# Patient Record
Sex: FEMALE | Race: WHITE | NOT HISPANIC OR LATINO | URBAN - METROPOLITAN AREA
[De-identification: names, ages, dates, MRNs, and addresses within clinical notes are randomized per-mention and may not be internally consistent; named-entity substitution may affect disease eponyms.]

---

## 2022-07-13 ENCOUNTER — OUTPATIENT (OUTPATIENT)
Dept: OUTPATIENT SERVICES | Facility: HOSPITAL | Age: 72
LOS: 1 days | End: 2022-07-13
Payer: MEDICARE

## 2022-07-13 VITALS
OXYGEN SATURATION: 99 % | HEIGHT: 64 IN | WEIGHT: 143.96 LBS | HEART RATE: 100 BPM | SYSTOLIC BLOOD PRESSURE: 157 MMHG | DIASTOLIC BLOOD PRESSURE: 74 MMHG | RESPIRATION RATE: 17 BRPM | TEMPERATURE: 97 F

## 2022-07-13 DIAGNOSIS — S62.001A UNSPECIFIED FRACTURE OF NAVICULAR [SCAPHOID] BONE OF RIGHT WRIST, INITIAL ENCOUNTER FOR CLOSED FRACTURE: ICD-10-CM

## 2022-07-13 DIAGNOSIS — Z98.890 OTHER SPECIFIED POSTPROCEDURAL STATES: Chronic | ICD-10-CM

## 2022-07-13 DIAGNOSIS — Z90.49 ACQUIRED ABSENCE OF OTHER SPECIFIED PARTS OF DIGESTIVE TRACT: Chronic | ICD-10-CM

## 2022-07-13 DIAGNOSIS — Z01.818 ENCOUNTER FOR OTHER PREPROCEDURAL EXAMINATION: ICD-10-CM
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
## 2022-07-13 LAB
ALBUMIN SERPL ELPH-MCNC: 4.3 G/DL — SIGNIFICANT CHANGE UP (ref 3.3–5)
ALP SERPL-CCNC: 100 U/L — SIGNIFICANT CHANGE UP (ref 40–120)
ALT FLD-CCNC: 22 U/L — SIGNIFICANT CHANGE UP (ref 12–78)
ANION GAP SERPL CALC-SCNC: 6 MMOL/L — SIGNIFICANT CHANGE UP (ref 5–17)
AST SERPL-CCNC: 18 U/L — SIGNIFICANT CHANGE UP (ref 15–37)
BILIRUB SERPL-MCNC: 0.5 MG/DL — SIGNIFICANT CHANGE UP (ref 0.2–1.2)
BUN SERPL-MCNC: 14 MG/DL — SIGNIFICANT CHANGE UP (ref 7–23)
CALCIUM SERPL-MCNC: 10.1 MG/DL — SIGNIFICANT CHANGE UP (ref 8.5–10.1)
CHLORIDE SERPL-SCNC: 106 MMOL/L — SIGNIFICANT CHANGE UP (ref 96–108)
CO2 SERPL-SCNC: 29 MMOL/L — SIGNIFICANT CHANGE UP (ref 22–31)
CREAT SERPL-MCNC: 0.67 MG/DL — SIGNIFICANT CHANGE UP (ref 0.5–1.3)
EGFR: 93 ML/MIN/1.73M2 — SIGNIFICANT CHANGE UP
GLUCOSE SERPL-MCNC: 98 MG/DL — SIGNIFICANT CHANGE UP (ref 70–99)
HCT VFR BLD CALC: 40.6 % — SIGNIFICANT CHANGE UP (ref 34.5–45)
HGB BLD-MCNC: 13.8 G/DL — SIGNIFICANT CHANGE UP (ref 11.5–15.5)
MCHC RBC-ENTMCNC: 33.3 PG — SIGNIFICANT CHANGE UP (ref 27–34)
MCHC RBC-ENTMCNC: 34 GM/DL — SIGNIFICANT CHANGE UP (ref 32–36)
MCV RBC AUTO: 98.1 FL — SIGNIFICANT CHANGE UP (ref 80–100)
NRBC # BLD: 0 /100 WBCS — SIGNIFICANT CHANGE UP (ref 0–0)
PLATELET # BLD AUTO: 460 K/UL — HIGH (ref 150–400)
POTASSIUM SERPL-MCNC: 3.5 MMOL/L — SIGNIFICANT CHANGE UP (ref 3.5–5.3)
POTASSIUM SERPL-SCNC: 3.5 MMOL/L — SIGNIFICANT CHANGE UP (ref 3.5–5.3)
PROT SERPL-MCNC: 7.6 G/DL — SIGNIFICANT CHANGE UP (ref 6–8.3)
RBC # BLD: 4.14 M/UL — SIGNIFICANT CHANGE UP (ref 3.8–5.2)
RBC # FLD: 14.5 % — SIGNIFICANT CHANGE UP (ref 10.3–14.5)
SODIUM SERPL-SCNC: 141 MMOL/L — SIGNIFICANT CHANGE UP (ref 135–145)
WBC # BLD: 10.42 K/UL — SIGNIFICANT CHANGE UP (ref 3.8–10.5)
WBC # FLD AUTO: 10.42 K/UL — SIGNIFICANT CHANGE UP (ref 3.8–10.5)

## 2022-07-13 PROCEDURE — G0463: CPT

## 2022-07-13 PROCEDURE — 93005 ELECTROCARDIOGRAM TRACING: CPT

## 2022-07-13 PROCEDURE — 93010 ELECTROCARDIOGRAM REPORT: CPT

## 2022-07-13 PROCEDURE — 80053 COMPREHEN METABOLIC PANEL: CPT

## 2022-07-13 PROCEDURE — 85027 COMPLETE CBC AUTOMATED: CPT

## 2022-07-13 PROCEDURE — 36415 COLL VENOUS BLD VENIPUNCTURE: CPT

## 2022-07-13 NOTE — H&P PST ADULT - PROBLEM SELECTOR PLAN 1
PST: CBC, CMP,EKG  Medical evaluation with Dr. Sprague  All preoperative instructions including CHD cleanse reviewed with patient who verbalized understanding.   Avoid all NSAID/ASA containing products as well as all herbal/vitamin supplements. Tylenol only for pain/headache.   covids   Fully vaccinated; Denies recent travel, recent illness and sick contact with COVID in the last 3 weeks

## 2022-07-13 NOTE — H&P PST ADULT - HISTORY OF PRESENT ILLNESS
This 70 yo f with a pMHX of HTN, HLD, Asthma  presents to PST for a scheduled  ORIF right scaphoid fracture with possible bone grafting using mini fluoroscopy  with Dr Muir on 7/21/22  . Pt is electing to have this procedure.

## 2022-07-13 NOTE — H&P PST ADULT - NSANTHOSAYNRD_GEN_A_CORE
No. ARIANNA screening performed.  STOP BANG Legend: 0-2 = LOW Risk; 3-4 = INTERMEDIATE Risk; 5-8 = HIGH Risk

## 2022-07-13 NOTE — H&P PST ADULT - FALL HARM RISK - UNIVERSAL INTERVENTIONS
Bed in lowest position, wheels locked, appropriate side rails in place/Call bell, personal items and telephone in reach/Instruct patient to call for assistance before getting out of bed or chair/Non-slip footwear when patient is out of bed/[place] to call system/Physically safe environment - no spills, clutter or unnecessary equipment/Purposeful Proactive Rounding/Room/bathroom lighting operational, light cord in reach

## 2022-07-13 NOTE — H&P PST ADULT - ASSESSMENT
This 70 yo f with a pMHX of HTN, HLD, Asthma  presents to PST for a scheduled  ORIF right scaphoid fracture with possible bone grafting using mini fluoroscopy  with Dr Muir on 7/21/22  .

## 2022-07-13 NOTE — H&P PST ADULT - RESPIRATORY
normal/clear to auscultation bilaterally/no wheezes/no rales/no rhonchi/no dull ness or hyperresonance to percussion

## 2022-07-20 ENCOUNTER — TRANSCRIPTION ENCOUNTER (OUTPATIENT)
Age: 72
End: 2022-07-20

## 2022-07-21 ENCOUNTER — TRANSCRIPTION ENCOUNTER (OUTPATIENT)
Age: 72
End: 2022-07-21

## 2022-07-21 ENCOUNTER — OUTPATIENT (OUTPATIENT)
Dept: OUTPATIENT SERVICES | Facility: HOSPITAL | Age: 72
LOS: 1 days | End: 2022-07-21
Payer: MEDICARE

## 2022-07-21 VITALS
WEIGHT: 139.99 LBS | RESPIRATION RATE: 15 BRPM | DIASTOLIC BLOOD PRESSURE: 76 MMHG | SYSTOLIC BLOOD PRESSURE: 136 MMHG | HEIGHT: 64 IN | OXYGEN SATURATION: 97 % | HEART RATE: 83 BPM | TEMPERATURE: 98 F

## 2022-07-21 VITALS
DIASTOLIC BLOOD PRESSURE: 67 MMHG | RESPIRATION RATE: 13 BRPM | SYSTOLIC BLOOD PRESSURE: 119 MMHG | HEART RATE: 85 BPM | OXYGEN SATURATION: 96 % | TEMPERATURE: 98 F

## 2022-07-21 DIAGNOSIS — Z98.890 OTHER SPECIFIED POSTPROCEDURAL STATES: Chronic | ICD-10-CM

## 2022-07-21 DIAGNOSIS — S62.001A UNSPECIFIED FRACTURE OF NAVICULAR [SCAPHOID] BONE OF RIGHT WRIST, INITIAL ENCOUNTER FOR CLOSED FRACTURE: ICD-10-CM

## 2022-07-21 DIAGNOSIS — Z90.49 ACQUIRED ABSENCE OF OTHER SPECIFIED PARTS OF DIGESTIVE TRACT: Chronic | ICD-10-CM

## 2022-07-21 PROCEDURE — 76000 FLUOROSCOPY <1 HR PHYS/QHP: CPT

## 2022-07-21 PROCEDURE — 25628 OPTX CARPL SCPHD FX INT FIXJ: CPT | Mod: RT

## 2022-07-21 PROCEDURE — C1713: CPT

## 2022-07-21 PROCEDURE — C1769: CPT

## 2022-07-21 DEVICE — GWIRE ST .045X6IN: Type: IMPLANTABLE DEVICE | Status: FUNCTIONAL

## 2022-07-21 DEVICE — IMPLANTABLE DEVICE: Type: IMPLANTABLE DEVICE | Status: FUNCTIONAL

## 2022-07-21 RX ORDER — SODIUM CHLORIDE 9 MG/ML
1000 INJECTION, SOLUTION INTRAVENOUS
Refills: 0 | Status: DISCONTINUED | OUTPATIENT
Start: 2022-07-21 | End: 2022-07-21

## 2022-07-21 RX ORDER — CEFAZOLIN SODIUM 1 G
2000 VIAL (EA) INJECTION ONCE
Refills: 0 | Status: COMPLETED | OUTPATIENT
Start: 2022-07-21 | End: 2022-07-21

## 2022-07-21 RX ORDER — HYDROMORPHONE HYDROCHLORIDE 2 MG/ML
0.5 INJECTION INTRAMUSCULAR; INTRAVENOUS; SUBCUTANEOUS
Refills: 0 | Status: DISCONTINUED | OUTPATIENT
Start: 2022-07-21 | End: 2022-07-21

## 2022-07-21 RX ORDER — OXYCODONE HYDROCHLORIDE 5 MG/1
5 TABLET ORAL ONCE
Refills: 0 | Status: DISCONTINUED | OUTPATIENT
Start: 2022-07-21 | End: 2022-07-21

## 2022-07-21 RX ORDER — ONDANSETRON 8 MG/1
4 TABLET, FILM COATED ORAL ONCE
Refills: 0 | Status: DISCONTINUED | OUTPATIENT
Start: 2022-07-21 | End: 2022-07-21

## 2022-07-21 RX ADMIN — HYDROMORPHONE HYDROCHLORIDE 0.5 MILLIGRAM(S): 2 INJECTION INTRAMUSCULAR; INTRAVENOUS; SUBCUTANEOUS at 13:31

## 2022-07-21 RX ADMIN — SODIUM CHLORIDE 60 MILLILITER(S): 9 INJECTION, SOLUTION INTRAVENOUS at 08:33

## 2022-07-21 RX ADMIN — SODIUM CHLORIDE 75 MILLILITER(S): 9 INJECTION, SOLUTION INTRAVENOUS at 13:17

## 2022-07-21 RX ADMIN — HYDROMORPHONE HYDROCHLORIDE 0.5 MILLIGRAM(S): 2 INJECTION INTRAMUSCULAR; INTRAVENOUS; SUBCUTANEOUS at 13:18

## 2022-07-21 RX ADMIN — OXYCODONE HYDROCHLORIDE 5 MILLIGRAM(S): 5 TABLET ORAL at 14:32

## 2022-07-21 RX ADMIN — OXYCODONE HYDROCHLORIDE 5 MILLIGRAM(S): 5 TABLET ORAL at 13:31

## 2022-07-21 NOTE — ASU DISCHARGE PLAN (ADULT/PEDIATRIC) - NS MD DC FALL RISK RISK
For information on Fall & Injury Prevention, visit: [email or web address] OR  [email or web address] OR  https://www.cdc.gov/steadi/patient.html

## 2022-07-21 NOTE — ASU DISCHARGE PLAN (ADULT/PEDIATRIC) - CARE PROVIDER_API CALL
George Bledsoe (MD)  Orthopaedic Surgery  [address] [place] [address]  Phone: [phone]  Fax: [phone]  Established Patient  Follow Up Time: 2 weeks

## 2022-07-21 NOTE — ASU DISCHARGE PLAN (ADULT/PEDIATRIC) - ASU DC SPECIAL INSTRUCTIONSFT
1.  Keep dressing/splint clean and dry; do NOT remove.    2.  Elevate your hand above chest level as much as possible throughout the day, to help reduce swelling.    3.  Call Dr. Bledsoe's office as soon as possible, to schedule a follow-up appointment to be seen in 13 days (on Wednesday 8/3).    4.  Smoking cessation is highly recommended to promote adequate fracture healing.

## 2022-07-21 NOTE — ASU DISCHARGE PLAN (ADULT/PEDIATRIC) - MEDICATION INSTRUCTIONS
The following prescriptions have been electronically sent to your pharmacy:  1) Percocet 5/325 mg (take 1-to-2 tablets by mouth, every 4-to-6 hours as needed for as needed for pain; 2) Keflex 500 mg (take 1 tablet by mouth, 4 times a day x 5 days)

## 2022-08-17 ENCOUNTER — NON-APPOINTMENT (OUTPATIENT)
Age: 72
End: 2022-08-17

## 2023-01-02 ENCOUNTER — NON-APPOINTMENT (OUTPATIENT)
Age: 73
End: 2023-01-02

## 2023-02-08 NOTE — ASU PATIENT PROFILE, ADULT - ARRIVAL TIME
Labs - cbc, cmp, microalbumin    Attempted to call [facility], no answer.   Left msg relaying result note.  Advised to call back if any q's or concerns.   08:15

## 2023-07-24 PROBLEM — E78.5 HYPERLIPIDEMIA, UNSPECIFIED: Chronic | Status: ACTIVE | Noted: 2022-07-13

## 2023-07-24 PROBLEM — I10 ESSENTIAL (PRIMARY) HYPERTENSION: Chronic | Status: ACTIVE | Noted: 2022-07-13

## 2023-07-27 ENCOUNTER — NON-APPOINTMENT (OUTPATIENT)
Age: 73
End: 2023-07-27

## 2023-08-11 ENCOUNTER — APPOINTMENT (OUTPATIENT)
Dept: ORTHOPEDIC SURGERY | Facility: CLINIC | Age: 73
End: 2023-08-11
Payer: MEDICARE

## 2023-08-11 VITALS
HEIGHT: 65 IN | BODY MASS INDEX: 23.32 KG/M2 | WEIGHT: 140 LBS | DIASTOLIC BLOOD PRESSURE: 70 MMHG | SYSTOLIC BLOOD PRESSURE: 106 MMHG

## 2023-08-11 DIAGNOSIS — M25.562 PAIN IN LEFT KNEE: ICD-10-CM

## 2023-08-11 DIAGNOSIS — J45.20 MILD INTERMITTENT ASTHMA, UNCOMPLICATED: ICD-10-CM

## 2023-08-11 DIAGNOSIS — M17.12 UNILATERAL PRIMARY OSTEOARTHRITIS, LEFT KNEE: ICD-10-CM

## 2023-08-11 DIAGNOSIS — Z80.0 FAMILY HISTORY OF MALIGNANT NEOPLASM OF DIGESTIVE ORGANS: ICD-10-CM

## 2023-08-11 DIAGNOSIS — F17.200 NICOTINE DEPENDENCE, UNSPECIFIED, UNCOMPLICATED: ICD-10-CM

## 2023-08-11 DIAGNOSIS — Z86.39 PERSONAL HISTORY OF OTHER ENDOCRINE, NUTRITIONAL AND METABOLIC DISEASE: ICD-10-CM

## 2023-08-11 PROBLEM — Z00.00 ENCOUNTER FOR PREVENTIVE HEALTH EXAMINATION: Status: ACTIVE | Noted: 2023-08-11

## 2023-08-11 PROCEDURE — 73564 X-RAY EXAM KNEE 4 OR MORE: CPT | Mod: LT

## 2023-08-11 PROCEDURE — 99205 OFFICE O/P NEW HI 60 MIN: CPT

## 2023-08-11 RX ORDER — ATORVASTATIN CALCIUM 20 MG/1
20 TABLET, FILM COATED ORAL
Refills: 0 | Status: ACTIVE | COMMUNITY

## 2023-08-11 RX ORDER — BENAZEPRIL HYDROCHLORIDE AND HYDROCHLOROTHIAZIDE 20; 25 MG/1; MG/1
TABLET, FILM COATED ORAL
Refills: 0 | Status: ACTIVE | COMMUNITY

## 2023-08-11 RX ORDER — ASPIRIN 81 MG
81 TABLET,CHEWABLE ORAL
Refills: 0 | Status: ACTIVE | COMMUNITY

## 2023-08-11 RX ORDER — FLUTICASONE PROPION/SALMETEROL 500-50 MCG
BLISTER, WITH INHALATION DEVICE INHALATION
Refills: 0 | Status: ACTIVE | COMMUNITY

## 2023-08-11 RX ORDER — UBIDECARENONE 200 MG
CAPSULE ORAL
Refills: 0 | Status: ACTIVE | COMMUNITY

## 2023-08-11 NOTE — HISTORY OF PRESENT ILLNESS
[de-identified] : Ms. RAKESH BERNARD is a 72 year old female evaluation of a few months worth of left knee pain.  Her left knee pain is localized medially and posteriorly.  She also has significant swelling in his knee.  Patient denies any falls or trauma.  She states that over the past few months she had multiple rounds of gel and cortisone injections.  Her most recent injection was 5 weeks ago which was steroid.  Prior to this she had gel she reports little to no relief with either.  During 2 of these injections the knee was aspirated yellow fluid came right back.  She is tried therapy and anti-inflammatories without relief She reports smoking 1 pack of cigarettes a day and has a history of COPD.

## 2023-08-11 NOTE — PHYSICAL EXAM
[de-identified] : The patient appears well nourished and in no apparent distress.  The patient is alert and oriented to person, place, and time.   Affect and mood appear normal. The head is normocephalic and atraumatic.  The eyes reveal normal sclera and extra ocular muscles are intact. The tongue is midline with no apparent lesions.  Skin shows normal turgor with no evidence of eczema or psoriasis.  No respiratory distress noted.  Sensation grossly intact.		  [de-identified] : Exam of the left knee shows a varus alignment which is correctable, -5 to 112 degrees of flexion measured with a goniometer.  5/5 motor strength bilaterally distally. Sensation intact distally.		  [de-identified] : X-ray: 4 views of the left knee demonstrate bone on bone varus arthritis.

## 2023-08-11 NOTE — ADDENDUM
[FreeTextEntry1] : This note was authored by Robe eDnny working as a medical scribe for Dr. Jovan Rosas. The note was reviewed, edited, and revised by Dr. Jovan Rosas whom is in agreement with the exam findings, imaging findings, and treatment plan. 08/11/2023

## 2023-08-11 NOTE — DISCUSSION/SUMMARY
[de-identified] : RAKESH BERNARD is a 72 year old female who presents with left knee bone on bone varus arthritis. Based upon the patient's continued symptoms and failure to respond to conservative treatment, I have recommended a left total knee replacement for this patient. A long discussion took place with the patient describing what a total joint replacement is and what the procedure would entail. A knee model, similar to the implants that will be used during the operation, was utilized to demonstrate the implants. Choices of implant manufactures were discussed and reviewed. The ability to secure the implant utilizing cement or cementless (press fit) fixation was discussed. The patient agrees with the plan of care as well as the use of implants.  The hospitalization and post-operative care and rehabilitation were also discussed. The use of perioperative antibiotics and DVT prophylaxis were discussed. The risk, benefits and alternatives to a surgical intervention were discussed at length with the patient. The patient was also advised of risks related to the medical comorbidities and elevated body mass index (BMI). A lengthy discussion took place to review the most common complications including but not limited to: deep vein thrombosis, pulmonary embolus, heart attack, stroke, infection, wound breakdown, numbness, damage to nerves, tendon, muscles, arteries or other blood vessels, death and other possible complications from anesthesia. The patient was told that we will take steps to minimize these risks by using sterile technique, antibiotics and DVT prophylaxis when appropriate and follow the patient postoperatively in the office setting to monitor progress. The possibility of recurrent pain, no improvement in pain and actual worsening of pain were also discussed with the patient.  The discharge plan of care focused on the patient going home following surgery. The patient was encouraged to make the necessary arrangements to have someone stay with them when they are discharged home. Following discharge, a home care nurse will visit the patient. The home care nurse will open your home care case and request home physical therapy services. Home physical therapy will commence following discharge provided it is appropriate and covered by the health insurance benefit plan.  The benefits of surgery were discussed with the patient including the potential for improving the patient's current clinical condition through operative intervention. Alternatives to surgical intervention including continued conservative management were also discussed in detail. All questions were answered to the satisfaction of the patient. The treatment plan of care, as well as a model of a total knee equivalent to the one that will be used for their total joint replacement, was shared with the patient. The patient participated and agreed to the plan of care as well as the use of the recommended implants for their total joint replacement surgery.   We discussed that the knee replacement will be done with robotic assistance to enhance accuracy and dynamic joint balancing.		   The patient was counseled on smoking cessation prior to surgery. We discussed that her risk of perioperative complication is drastically higher if she is smoking and that we require smoking cessation prior to elective knee replacement surgery. The patient was referred to a smoking cessation program.

## 2023-08-11 NOTE — CONSULT LETTER
[Dear  ___] : Dear  [unfilled], [Consult Letter:] : I had the pleasure of evaluating your patient, [unfilled]. [Please see my note below.] : Please see my note below. [Consult Closing:] : Thank you very much for allowing me to participate in the care of this patient.  If you have any questions, please do not hesitate to contact me. [Sincerely,] : Sincerely, [FreeTextEntry2] : Aquilino Guerra MD [FreeTextEntry3] : Jovan Rosas MD Chief of Joint Replacement Primary & Revision Hip and Knee Replacement  [facility]

## 2023-08-19 ENCOUNTER — NON-APPOINTMENT (OUTPATIENT)
Age: 73
End: 2023-08-19

## 2023-09-05 ENCOUNTER — APPOINTMENT (OUTPATIENT)
Dept: CT IMAGING | Facility: CLINIC | Age: 73
End: 2023-09-05
Payer: MEDICARE

## 2023-09-05 PROCEDURE — 73700 CT LOWER EXTREMITY W/O DYE: CPT | Mod: LT

## 2023-09-12 ENCOUNTER — OUTPATIENT (OUTPATIENT)
Dept: OUTPATIENT SERVICES | Facility: HOSPITAL | Age: 73
LOS: 1 days | End: 2023-09-12
Payer: MEDICARE

## 2023-09-12 VITALS
HEIGHT: 64 IN | OXYGEN SATURATION: 98 % | TEMPERATURE: 97 F | HEART RATE: 98 BPM | DIASTOLIC BLOOD PRESSURE: 84 MMHG | WEIGHT: 137.13 LBS | SYSTOLIC BLOOD PRESSURE: 132 MMHG | RESPIRATION RATE: 18 BRPM

## 2023-09-12 DIAGNOSIS — I10 ESSENTIAL (PRIMARY) HYPERTENSION: ICD-10-CM

## 2023-09-12 DIAGNOSIS — Z13.89 ENCOUNTER FOR SCREENING FOR OTHER DISORDER: ICD-10-CM

## 2023-09-12 DIAGNOSIS — Z98.890 OTHER SPECIFIED POSTPROCEDURAL STATES: Chronic | ICD-10-CM

## 2023-09-12 DIAGNOSIS — M17.12 UNILATERAL PRIMARY OSTEOARTHRITIS, LEFT KNEE: ICD-10-CM

## 2023-09-12 DIAGNOSIS — Z29.9 ENCOUNTER FOR PROPHYLACTIC MEASURES, UNSPECIFIED: ICD-10-CM

## 2023-09-12 DIAGNOSIS — Z01.818 ENCOUNTER FOR OTHER PREPROCEDURAL EXAMINATION: ICD-10-CM

## 2023-09-12 DIAGNOSIS — Z90.49 ACQUIRED ABSENCE OF OTHER SPECIFIED PARTS OF DIGESTIVE TRACT: Chronic | ICD-10-CM

## 2023-09-12 LAB
A1C WITH ESTIMATED AVERAGE GLUCOSE RESULT: 5.6 % — SIGNIFICANT CHANGE UP (ref 4–5.6)
ANION GAP SERPL CALC-SCNC: 14 MMOL/L — SIGNIFICANT CHANGE UP (ref 5–17)
APTT BLD: 29.6 SEC — SIGNIFICANT CHANGE UP (ref 24.5–35.6)
BASOPHILS # BLD AUTO: 0.09 K/UL — SIGNIFICANT CHANGE UP (ref 0–0.2)
BASOPHILS NFR BLD AUTO: 0.9 % — SIGNIFICANT CHANGE UP (ref 0–2)
BLD GP AB SCN SERPL QL: SIGNIFICANT CHANGE UP
BUN SERPL-MCNC: 13.2 MG/DL — SIGNIFICANT CHANGE UP (ref 8–20)
CALCIUM SERPL-MCNC: 9.9 MG/DL — SIGNIFICANT CHANGE UP (ref 8.4–10.5)
CHLORIDE SERPL-SCNC: 99 MMOL/L — SIGNIFICANT CHANGE UP (ref 96–108)
CO2 SERPL-SCNC: 27 MMOL/L — SIGNIFICANT CHANGE UP (ref 22–29)
CREAT SERPL-MCNC: 0.55 MG/DL — SIGNIFICANT CHANGE UP (ref 0.5–1.3)
EGFR: 97 ML/MIN/1.73M2 — SIGNIFICANT CHANGE UP
EOSINOPHIL # BLD AUTO: 0.11 K/UL — SIGNIFICANT CHANGE UP (ref 0–0.5)
EOSINOPHIL NFR BLD AUTO: 1.1 % — SIGNIFICANT CHANGE UP (ref 0–6)
ESTIMATED AVERAGE GLUCOSE: 114 MG/DL — SIGNIFICANT CHANGE UP (ref 68–114)
GLUCOSE SERPL-MCNC: 98 MG/DL — SIGNIFICANT CHANGE UP (ref 70–99)
HCT VFR BLD CALC: 39.6 % — SIGNIFICANT CHANGE UP (ref 34.5–45)
HGB BLD-MCNC: 13.5 G/DL — SIGNIFICANT CHANGE UP (ref 11.5–15.5)
IMM GRANULOCYTES NFR BLD AUTO: 0.3 % — SIGNIFICANT CHANGE UP (ref 0–0.9)
INR BLD: 0.86 RATIO — SIGNIFICANT CHANGE UP (ref 0.85–1.18)
LYMPHOCYTES # BLD AUTO: 3.15 K/UL — SIGNIFICANT CHANGE UP (ref 1–3.3)
LYMPHOCYTES # BLD AUTO: 30.1 % — SIGNIFICANT CHANGE UP (ref 13–44)
MCHC RBC-ENTMCNC: 33 PG — SIGNIFICANT CHANGE UP (ref 27–34)
MCHC RBC-ENTMCNC: 34.1 GM/DL — SIGNIFICANT CHANGE UP (ref 32–36)
MCV RBC AUTO: 96.8 FL — SIGNIFICANT CHANGE UP (ref 80–100)
MONOCYTES # BLD AUTO: 1.17 K/UL — HIGH (ref 0–0.9)
MONOCYTES NFR BLD AUTO: 11.2 % — SIGNIFICANT CHANGE UP (ref 2–14)
MRSA PCR RESULT.: SIGNIFICANT CHANGE UP
NEUTROPHILS # BLD AUTO: 5.91 K/UL — SIGNIFICANT CHANGE UP (ref 1.8–7.4)
NEUTROPHILS NFR BLD AUTO: 56.4 % — SIGNIFICANT CHANGE UP (ref 43–77)
PLATELET # BLD AUTO: 492 K/UL — HIGH (ref 150–400)
POTASSIUM SERPL-MCNC: 3.7 MMOL/L — SIGNIFICANT CHANGE UP (ref 3.5–5.3)
POTASSIUM SERPL-SCNC: 3.7 MMOL/L — SIGNIFICANT CHANGE UP (ref 3.5–5.3)
PROTHROM AB SERPL-ACNC: 9.6 SEC — SIGNIFICANT CHANGE UP (ref 9.5–13)
RBC # BLD: 4.09 M/UL — SIGNIFICANT CHANGE UP (ref 3.8–5.2)
RBC # FLD: 13.1 % — SIGNIFICANT CHANGE UP (ref 10.3–14.5)
S AUREUS DNA NOSE QL NAA+PROBE: SIGNIFICANT CHANGE UP
SODIUM SERPL-SCNC: 140 MMOL/L — SIGNIFICANT CHANGE UP (ref 135–145)
WBC # BLD: 10.46 K/UL — SIGNIFICANT CHANGE UP (ref 3.8–10.5)
WBC # FLD AUTO: 10.46 K/UL — SIGNIFICANT CHANGE UP (ref 3.8–10.5)

## 2023-09-12 PROCEDURE — 71046 X-RAY EXAM CHEST 2 VIEWS: CPT

## 2023-09-12 PROCEDURE — 71046 X-RAY EXAM CHEST 2 VIEWS: CPT | Mod: 26

## 2023-09-12 PROCEDURE — 93010 ELECTROCARDIOGRAM REPORT: CPT

## 2023-09-12 PROCEDURE — G0463: CPT

## 2023-09-12 PROCEDURE — 93005 ELECTROCARDIOGRAM TRACING: CPT

## 2023-09-12 RX ORDER — SODIUM CHLORIDE 9 MG/ML
3 INJECTION INTRAMUSCULAR; INTRAVENOUS; SUBCUTANEOUS EVERY 8 HOURS
Refills: 0 | Status: DISCONTINUED | OUTPATIENT
Start: 2023-10-02 | End: 2023-10-02

## 2023-09-12 NOTE — H&P PST ADULT - PROBLEM SELECTOR PLAN 1
71 yo with PMH HTN, asthma, HLD now with left knee osteoarthritis scheduled for left total knee replacement on 10/2/2023.     -Medical evaluation pending  - Patient educated on ERP protocol (written/verbal)- verbalized understanding  -Educated on NSAIDS, multivitamins and herbals that increase the risk of bleeding and need to be stopped 5 days before procedure  -Educated on infection prevention  -Tylenol can be taken 5 days before surgery if needed for pain  -Stop benazapril 24 hours before surgery  -ASA per PCP  -Will continue all other medications as prescribed  -Verbalized understanding of all instructions. 73 yo with PMH HTN, asthma, HLD now with left knee osteoarthritis scheduled for left total knee replacement on 10/2/2023.     -Medical evaluation pending  -Cardiac evaluation pending  - Patient educated on ERP protocol (written/verbal)- verbalized understanding  -Educated on NSAIDS, multivitamins and herbals that increase the risk of bleeding and need to be stopped 5 days before procedure  -Educated on infection prevention  -Tylenol can be taken 5 days before surgery if needed for pain  -Stop benazapril 24 hours before surgery  -ASA per PCP  -Will continue all other medications as prescribed  -Verbalized understanding of all instructions.

## 2023-09-12 NOTE — H&P PST ADULT - MUSCULOSKELETAL
details… no joint swelling/no joint erythema/no joint warmth/strength 5/5 bilateral upper extremities/strength 5/5 bilateral lower extremities

## 2023-09-12 NOTE — H&P PST ADULT - NSICDXFAMILYHX_GEN_ALL_CORE_FT
FAMILY HISTORY:  Father  Still living? Unknown  FH: liver cancer, Age at diagnosis: Age Unknown    Sibling  Still living? Unknown  FH: liver cancer, Age at diagnosis: Age Unknown

## 2023-09-12 NOTE — H&P PST ADULT - ADMIT DATE
Call 911 for stroke/Need for follow up after discharge/Prescribed medications/Risk factors for stroke/Stroke education booklet/Stroke support groups for patients, families, and friends/Stroke warning signs and symptoms/Signs and symptoms of stroke
12-Sep-2023

## 2023-09-12 NOTE — H&P PST ADULT - HISTORY OF PRESENT ILLNESS
71 yo with PMH HTN,HLD presents to PST today. Pt. reports pain to the left knee for about 6 months. Described pain as ache with no radiation to LLE. Received many gel injections in the past with no relief. Reports completed cortisone injections previously with the last one with no relief. Pt. reports pain is intermittent. Walking upstairs have been exacerbating pain. Lying down alleviate pain. Pain levels include a current pain level of 7/10, a minimum pain level of 5/10 and a maximum pain level of 10/10. Pt. Self ambulates.  73 yo with PMH HTN, HLD, asthma presents to PST today. Pt. reports pain to the left knee for about 6 months. Described pain as ache with no radiation to LLE. Received many gel injections in the past with no relief. Reports completed cortisone injections previously with the last one with no relief. Pt. reports pain is intermittent. Walking upstairs have been exacerbating pain. Lying down alleviate pain. Pain levels include a current pain level of 7/10, a minimum pain level of 5/10 and a maximum pain level of 10/10. Pt. Self ambulates. Scheduled for left total knee replacement on 10/2/2023.  73 yo with PMH HTN, HLD, abnormal EKG, asthma presents to PST today. Pt. reports pain to the left knee for about 6 months. Described pain as ache with no radiation to LLE. Received many gel injections in the past with no relief. Reports completed cortisone injections previously with the last one with no relief. Pt. reports pain is intermittent. Walking upstairs have been exacerbating pain. Lying down alleviate pain. Pain levels include a current pain level of 7/10, a minimum pain level of 5/10 and a maximum pain level of 10/10. Pt. Self ambulates. Scheduled for left total knee replacement on 10/2/2023.

## 2023-09-12 NOTE — H&P PST ADULT - ASSESSMENT
73 yo with PMH HTN, asthma, HLD now with left knee osteoarthritis scheduled for left total knee replacement on 10/2/2023.     -Medical evaluation pending  - Patient educated on ERP protocol (written/verbal)- verbalized understanding  -Educated on NSAIDS, multivitamins and herbals that increase the risk of bleeding and need to be stopped 5 days before procedure  -Educated on infection prevention  -Tylenol can be taken 5 days before surgery if needed for pain  -Stop benazapril 24 hours before surgery  -ASA per PCP  -Will continue all other medications as prescribed  -Verbalized understanding of all instructions.      OPIOID RISK TOOL    TOSHIA EACH BOX THAT APPLIES AND ADD TOTALS AT THE END    FAMILY HISTORY OF SUBSTANCE ABUSE                 FEMALE         MALE                                                Alcohol                             [  ]1 pt          [  ]3pts                                               Illegal Durgs                     [  ]2 pts        [  ]3pts                                               Rx Drugs                           [  ]4 pts        [  ]4 pts    PERSONAL HISTORY OF SUBSTANCE ABUSE                                                                                          Alcohol                             [  ]3 pts       [  ]3 pts                                               Illegal Drugs                     [  ]4 pts        [  ]4 pts                                               Rx Drugs                           [  ]5 pts        [  ]5 pts    AGE BETWEEN 16-45 YEARS                                      [  ]1 pt         [  ]1 pt    HISTORY OF PREADOLESCENT   SEXUAL ABUSE                                                             [  ]3 pts        [  ]0pts    PSYCHOLOGICAL DISEASE                     ADD, OCD, Bipolar, Schizophrenia        [  ]2 pts         [  ]2 pts                      Depression                                               [  ]1 pt           [  ]1 pt           SCORING TOTAL   (add numbers and type here)              (0)                                     A score of 3 or lower indicated LOW risk for future opioid abuse  A score of 4 to 7 indicated moderate risk for future opioid abuse  A score of 8 or higher indicates a high risk for opioid abuse      CAPRINI SCORE [CLOT]    AGE RELATED RISK FACTORS                                                       MOBILITY RELATED FACTORS  [ ] Age 41-60 years                                            (1 Point)                  [ ] Bed rest                                                        (1 Point)  [x ] Age: 61-74 years                                           (2 Points)                 [ ] Plaster cast                                                   (2 Points)  [ ] Age= 75 years                                              (3 Points)                 [ ] Bed bound for more than 72 hours                 (2 Points)    DISEASE RELATED RISK FACTORS                                               GENDER SPECIFIC FACTORS  [ ] Edema in the lower extremities                       (1 Point)                  [ ] Pregnancy                                                     (1 Point)  [ ] Varicose veins                                               (1 Point)                  [ ] Post-partum < 6 weeks                                   (1 Point)             [ ] BMI > 25 Kg/m2                                            (1 Point)                  [ ] Hormonal therapy  or oral contraception          (1 Point)                 [ ] Sepsis (in the previous month)                        (1 Point)                  [ ] History of pregnancy complications                 (1 point)  [ ] Pneumonia or serious lung disease                                               [ ] Unexplained or recurrent                     (1 Point)           (in the previous month)                               (1 Point)  [ ] Abnormal pulmonary function test                     (1 Point)                 SURGERY RELATED RISK FACTORS  [ ] Acute myocardial infarction                              (1 Point)                 [ ]  Section                                             (1 Point)  [ ] Congestive heart failure (in the previous month)  (1 Point)               [ ] Minor surgery                                                  (1 Point)   [ ] Inflammatory bowel disease                             (1 Point)                 [ ] Arthroscopic surgery                                        (2 Points)  [ ] Central venous access                                      (2 Points)                [x ] General surgery lasting more than 45 minutes   (2 Points)       [ ] Stroke (in the previous month)                          (5 Points)               [ ] Elective arthroplasty                                         (5 Points)                                                                                                                                               HEMATOLOGY RELATED FACTORS                                                 TRAUMA RELATED RISK FACTORS  [ ] Prior episodes of VTE                                     (3 Points)                [ ] Fracture of the hip, pelvis, or leg                       (5 Points)  [ ] Positive family history for VTE                         (3 Points)                 [ ] Acute spinal cord injury (in the previous month)  (5 Points)  [ ] Prothrombin 23912 A                                     (3 Points)                 [ ] Paralysis  (less than 1 month)                             (5 Points)  [ ] Factor V Leiden                                             (3 Points)                  [ ] Multiple Trauma within 1 month                        (5 Points)  [ ] Lupus anticoagulants                                     (3 Points)                                                           [ ] Anticardiolipin antibodies                               (3 Points)                                                       [ ] High homocysteine in the blood                      (3 Points)                                             [ ] Other congenital or acquired thrombophilia      (3 Points)                                                [ ] Heparin induced thrombocytopenia                  (3 Points)                                          Total Score [        4  ]    Caprini Score 0 - 2:  Low Risk, No VTE Prophylaxis required for most patients, encourage ambulation  Caprini Score 3 - 6:  At Risk, pharmacologic VTE prophylaxis is indicated for most patients (in the absence of a contraindication)  Caprini Score Greater than or = 7:  High Risk, pharmacologic VTE prophylaxis is indicated for most patients (in the absence of a contraindication)   73 yo with PMH HTN, asthma, abnormal EKG, HLD now with left knee osteoarthritis scheduled for left total knee replacement on 10/2/2023.     -Medical evaluation pending  -Cardiac evaluation pending  - Patient educated on ERP protocol (written/verbal)- verbalized understanding  -Educated on NSAIDS, multivitamins and herbals that increase the risk of bleeding and need to be stopped 5 days before procedure  -Educated on infection prevention  -Tylenol can be taken 5 days before surgery if needed for pain  -Stop benazapril 24 hours before surgery  -ASA per PCP  -Will continue all other medications as prescribed  -Verbalized understanding of all instructions.      OPIOID RISK TOOL    TOSHIA EACH BOX THAT APPLIES AND ADD TOTALS AT THE END    FAMILY HISTORY OF SUBSTANCE ABUSE                 FEMALE         MALE                                                Alcohol                             [  ]1 pt          [  ]3pts                                               Illegal Durgs                     [  ]2 pts        [  ]3pts                                               Rx Drugs                           [  ]4 pts        [  ]4 pts    PERSONAL HISTORY OF SUBSTANCE ABUSE                                                                                          Alcohol                             [  ]3 pts       [  ]3 pts                                               Illegal Drugs                     [  ]4 pts        [  ]4 pts                                               Rx Drugs                           [  ]5 pts        [  ]5 pts    AGE BETWEEN 16-45 YEARS                                      [  ]1 pt         [  ]1 pt    HISTORY OF PREADOLESCENT   SEXUAL ABUSE                                                             [  ]3 pts        [  ]0pts    PSYCHOLOGICAL DISEASE                     ADD, OCD, Bipolar, Schizophrenia        [  ]2 pts         [  ]2 pts                      Depression                                               [  ]1 pt           [  ]1 pt           SCORING TOTAL   (add numbers and type here)              (0)                                     A score of 3 or lower indicated LOW risk for future opioid abuse  A score of 4 to 7 indicated moderate risk for future opioid abuse  A score of 8 or higher indicates a high risk for opioid abuse      CAPRINI SCORE [CLOT]    AGE RELATED RISK FACTORS                                                       MOBILITY RELATED FACTORS  [ ] Age 41-60 years                                            (1 Point)                  [ ] Bed rest                                                        (1 Point)  [x ] Age: 61-74 years                                           (2 Points)                 [ ] Plaster cast                                                   (2 Points)  [ ] Age= 75 years                                              (3 Points)                 [ ] Bed bound for more than 72 hours                 (2 Points)    DISEASE RELATED RISK FACTORS                                               GENDER SPECIFIC FACTORS  [ ] Edema in the lower extremities                       (1 Point)                  [ ] Pregnancy                                                     (1 Point)  [ ] Varicose veins                                               (1 Point)                  [ ] Post-partum < 6 weeks                                   (1 Point)             [ ] BMI > 25 Kg/m2                                            (1 Point)                  [ ] Hormonal therapy  or oral contraception          (1 Point)                 [ ] Sepsis (in the previous month)                        (1 Point)                  [ ] History of pregnancy complications                 (1 point)  [ ] Pneumonia or serious lung disease                                               [ ] Unexplained or recurrent                     (1 Point)           (in the previous month)                               (1 Point)  [ ] Abnormal pulmonary function test                     (1 Point)                 SURGERY RELATED RISK FACTORS  [ ] Acute myocardial infarction                              (1 Point)                 [ ]  Section                                             (1 Point)  [ ] Congestive heart failure (in the previous month)  (1 Point)               [ ] Minor surgery                                                  (1 Point)   [ ] Inflammatory bowel disease                             (1 Point)                 [ ] Arthroscopic surgery                                        (2 Points)  [ ] Central venous access                                      (2 Points)                [x ] General surgery lasting more than 45 minutes   (2 Points)       [ ] Stroke (in the previous month)                          (5 Points)               [ ] Elective arthroplasty                                         (5 Points)                                                                                                                                               HEMATOLOGY RELATED FACTORS                                                 TRAUMA RELATED RISK FACTORS  [ ] Prior episodes of VTE                                     (3 Points)                [ ] Fracture of the hip, pelvis, or leg                       (5 Points)  [ ] Positive family history for VTE                         (3 Points)                 [ ] Acute spinal cord injury (in the previous month)  (5 Points)  [ ] Prothrombin 20996 A                                     (3 Points)                 [ ] Paralysis  (less than 1 month)                             (5 Points)  [ ] Factor V Leiden                                             (3 Points)                  [ ] Multiple Trauma within 1 month                        (5 Points)  [ ] Lupus anticoagulants                                     (3 Points)                                                           [ ] Anticardiolipin antibodies                               (3 Points)                                                       [ ] High homocysteine in the blood                      (3 Points)                                             [ ] Other congenital or acquired thrombophilia      (3 Points)                                                [ ] Heparin induced thrombocytopenia                  (3 Points)                                          Total Score [        4  ]    Caprini Score 0 - 2:  Low Risk, No VTE Prophylaxis required for most patients, encourage ambulation  Caprini Score 3 - 6:  At Risk, pharmacologic VTE prophylaxis is indicated for most patients (in the absence of a contraindication)  Caprini Score Greater than or = 7:  High Risk, pharmacologic VTE prophylaxis is indicated for most patients (in the absence of a contraindication)

## 2023-09-17 ENCOUNTER — NON-APPOINTMENT (OUTPATIENT)
Age: 73
End: 2023-09-17

## 2023-09-27 RX ORDER — TRANEXAMIC ACID 100 MG/ML
1000 INJECTION, SOLUTION INTRAVENOUS ONCE
Refills: 0 | Status: DISCONTINUED | OUTPATIENT
Start: 2023-10-02 | End: 2023-10-02

## 2023-10-01 ENCOUNTER — TRANSCRIPTION ENCOUNTER (OUTPATIENT)
Age: 73
End: 2023-10-01

## 2023-10-02 ENCOUNTER — APPOINTMENT (OUTPATIENT)
Dept: ORTHOPEDIC SURGERY | Facility: HOSPITAL | Age: 73
End: 2023-10-02

## 2023-10-02 ENCOUNTER — TRANSCRIPTION ENCOUNTER (OUTPATIENT)
Age: 73
End: 2023-10-02

## 2023-10-02 ENCOUNTER — INPATIENT (INPATIENT)
Facility: HOSPITAL | Age: 73
LOS: 0 days | Discharge: HOME HEALTH | DRG: 470 | End: 2023-10-03
Attending: ORTHOPAEDIC SURGERY | Admitting: ORTHOPAEDIC SURGERY
Payer: COMMERCIAL

## 2023-10-02 VITALS
OXYGEN SATURATION: 98 % | RESPIRATION RATE: 18 BRPM | SYSTOLIC BLOOD PRESSURE: 156 MMHG | WEIGHT: 136.03 LBS | TEMPERATURE: 98 F | DIASTOLIC BLOOD PRESSURE: 74 MMHG | HEIGHT: 64 IN | HEART RATE: 81 BPM

## 2023-10-02 DIAGNOSIS — M17.12 UNILATERAL PRIMARY OSTEOARTHRITIS, LEFT KNEE: ICD-10-CM

## 2023-10-02 DIAGNOSIS — Z90.49 ACQUIRED ABSENCE OF OTHER SPECIFIED PARTS OF DIGESTIVE TRACT: Chronic | ICD-10-CM

## 2023-10-02 DIAGNOSIS — Z98.890 OTHER SPECIFIED POSTPROCEDURAL STATES: Chronic | ICD-10-CM

## 2023-10-02 LAB — BLD GP AB SCN SERPL QL: SIGNIFICANT CHANGE UP

## 2023-10-02 PROCEDURE — 27447 TOTAL KNEE ARTHROPLASTY: CPT | Mod: LT

## 2023-10-02 PROCEDURE — 0055T BONE SRGRY CMPTR CT/MRI IMAG: CPT | Mod: LT

## 2023-10-02 PROCEDURE — 73560 X-RAY EXAM OF KNEE 1 OR 2: CPT | Mod: 26,LT

## 2023-10-02 PROCEDURE — 27447 TOTAL KNEE ARTHROPLASTY: CPT | Mod: AS,LT

## 2023-10-02 DEVICE — BASEPLATE TIB UNIV TRIATHLON SZ 2: Type: IMPLANTABLE DEVICE | Site: LEFT | Status: FUNCTIONAL

## 2023-10-02 DEVICE — IMP PATELLA SYMMETRIC X3 29X8MM: Type: IMPLANTABLE DEVICE | Site: LEFT | Status: FUNCTIONAL

## 2023-10-02 DEVICE — MAKO BONE PIN 4MM X 140MM: Type: IMPLANTABLE DEVICE | Site: LEFT | Status: FUNCTIONAL

## 2023-10-02 DEVICE — COMP FEM TRIATHLON CR SZ 3 LT: Type: IMPLANTABLE DEVICE | Site: LEFT | Status: FUNCTIONAL

## 2023-10-02 DEVICE — ZIMMER FEMALE HEX SCREW MAGNETIC 2.5MM X 25MM: Type: IMPLANTABLE DEVICE | Site: LEFT | Status: FUNCTIONAL

## 2023-10-02 DEVICE — CEMENT PALACOS R: Type: IMPLANTABLE DEVICE | Site: LEFT | Status: FUNCTIONAL

## 2023-10-02 DEVICE — MAKO BONE PIN 4MM X 80MM: Type: IMPLANTABLE DEVICE | Site: LEFT | Status: FUNCTIONAL

## 2023-10-02 DEVICE — INSERT TIB BEARING CS X3 SZ 2 9MM: Type: IMPLANTABLE DEVICE | Site: LEFT | Status: FUNCTIONAL

## 2023-10-02 RX ORDER — CEFAZOLIN SODIUM 1 G
2000 VIAL (EA) INJECTION ONCE
Refills: 0 | Status: DISCONTINUED | OUTPATIENT
Start: 2023-10-02 | End: 2023-10-02

## 2023-10-02 RX ORDER — HYDROMORPHONE HYDROCHLORIDE 2 MG/ML
4 INJECTION INTRAMUSCULAR; INTRAVENOUS; SUBCUTANEOUS
Refills: 0 | Status: DISCONTINUED | OUTPATIENT
Start: 2023-10-02 | End: 2023-10-03

## 2023-10-02 RX ORDER — ACETAMINOPHEN 500 MG
975 TABLET ORAL ONCE
Refills: 0 | Status: COMPLETED | OUTPATIENT
Start: 2023-10-02 | End: 2023-10-02

## 2023-10-02 RX ORDER — CELECOXIB 200 MG/1
400 CAPSULE ORAL ONCE
Refills: 0 | Status: COMPLETED | OUTPATIENT
Start: 2023-10-02 | End: 2023-10-02

## 2023-10-02 RX ORDER — SODIUM CHLORIDE 9 MG/ML
1000 INJECTION, SOLUTION INTRAVENOUS
Refills: 0 | Status: DISCONTINUED | OUTPATIENT
Start: 2023-10-02 | End: 2023-10-02

## 2023-10-02 RX ORDER — OXYCODONE HYDROCHLORIDE 5 MG/1
5 TABLET ORAL
Refills: 0 | Status: DISCONTINUED | OUTPATIENT
Start: 2023-10-02 | End: 2023-10-03

## 2023-10-02 RX ORDER — ALBUTEROL 90 UG/1
2 AEROSOL, METERED ORAL
Refills: 0 | DISCHARGE

## 2023-10-02 RX ORDER — ONDANSETRON 8 MG/1
4 TABLET, FILM COATED ORAL ONCE
Refills: 0 | Status: DISCONTINUED | OUTPATIENT
Start: 2023-10-02 | End: 2023-10-02

## 2023-10-02 RX ORDER — APREPITANT 80 MG/1
40 CAPSULE ORAL ONCE
Refills: 0 | Status: COMPLETED | OUTPATIENT
Start: 2023-10-02 | End: 2023-10-02

## 2023-10-02 RX ORDER — INFLUENZA VIRUS VACCINE 15; 15; 15; 15 UG/.5ML; UG/.5ML; UG/.5ML; UG/.5ML
0.7 SUSPENSION INTRAMUSCULAR ONCE
Refills: 0 | Status: DISCONTINUED | OUTPATIENT
Start: 2023-10-02 | End: 2023-10-03

## 2023-10-02 RX ORDER — ALBUTEROL 90 UG/1
2 AEROSOL, METERED ORAL EVERY 6 HOURS
Refills: 0 | Status: DISCONTINUED | OUTPATIENT
Start: 2023-10-02 | End: 2023-10-03

## 2023-10-02 RX ORDER — BENZOCAINE AND MENTHOL 5; 1 G/100ML; G/100ML
1 LIQUID ORAL EVERY 24 HOURS
Refills: 0 | Status: DISCONTINUED | OUTPATIENT
Start: 2023-10-02 | End: 2023-10-03

## 2023-10-02 RX ORDER — POLYETHYLENE GLYCOL 3350 17 G/17G
17 POWDER, FOR SOLUTION ORAL AT BEDTIME
Refills: 0 | Status: DISCONTINUED | OUTPATIENT
Start: 2023-10-02 | End: 2023-10-03

## 2023-10-02 RX ORDER — KETOROLAC TROMETHAMINE 30 MG/ML
15 SYRINGE (ML) INJECTION EVERY 6 HOURS
Refills: 0 | Status: COMPLETED | OUTPATIENT
Start: 2023-10-02 | End: 2023-10-03

## 2023-10-02 RX ORDER — FENTANYL CITRATE 50 UG/ML
25 INJECTION INTRAVENOUS
Refills: 0 | Status: DISCONTINUED | OUTPATIENT
Start: 2023-10-02 | End: 2023-10-02

## 2023-10-02 RX ORDER — ATORVASTATIN CALCIUM 80 MG/1
0 TABLET, FILM COATED ORAL
Qty: 0 | Refills: 0 | DISCHARGE

## 2023-10-02 RX ORDER — LISINOPRIL 2.5 MG/1
20 TABLET ORAL DAILY
Refills: 0 | Status: DISCONTINUED | OUTPATIENT
Start: 2023-10-03 | End: 2023-10-03

## 2023-10-02 RX ORDER — BENAZEPRIL HYDROCHLORIDE AND HYDROCHLOROTHIAZIDE 10; 12.5 MG/1; MG/1
0 TABLET, FILM COATED ORAL
Qty: 0 | Refills: 0 | DISCHARGE

## 2023-10-02 RX ORDER — ACETAMINOPHEN 500 MG
975 TABLET ORAL EVERY 8 HOURS
Refills: 0 | Status: DISCONTINUED | OUTPATIENT
Start: 2023-10-02 | End: 2023-10-03

## 2023-10-02 RX ORDER — MAGNESIUM HYDROXIDE 400 MG/1
30 TABLET, CHEWABLE ORAL DAILY
Refills: 0 | Status: DISCONTINUED | OUTPATIENT
Start: 2023-10-02 | End: 2023-10-03

## 2023-10-02 RX ORDER — CELECOXIB 200 MG/1
200 CAPSULE ORAL EVERY 12 HOURS
Refills: 0 | Status: CANCELLED | OUTPATIENT
Start: 2023-10-04 | End: 2023-10-03

## 2023-10-02 RX ORDER — FLUTICASONE PROPIONATE AND SALMETEROL 50; 250 UG/1; UG/1
1 POWDER ORAL; RESPIRATORY (INHALATION)
Refills: 0 | DISCHARGE

## 2023-10-02 RX ORDER — ONDANSETRON 8 MG/1
4 TABLET, FILM COATED ORAL EVERY 6 HOURS
Refills: 0 | Status: DISCONTINUED | OUTPATIENT
Start: 2023-10-02 | End: 2023-10-03

## 2023-10-02 RX ORDER — SENNA PLUS 8.6 MG/1
2 TABLET ORAL AT BEDTIME
Refills: 0 | Status: DISCONTINUED | OUTPATIENT
Start: 2023-10-02 | End: 2023-10-03

## 2023-10-02 RX ORDER — ASPIRIN/CALCIUM CARB/MAGNESIUM 324 MG
1 TABLET ORAL
Refills: 0 | DISCHARGE

## 2023-10-02 RX ORDER — OXYCODONE HYDROCHLORIDE 5 MG/1
10 TABLET ORAL
Refills: 0 | Status: DISCONTINUED | OUTPATIENT
Start: 2023-10-02 | End: 2023-10-03

## 2023-10-02 RX ORDER — CEFAZOLIN SODIUM 1 G
2000 VIAL (EA) INJECTION
Refills: 0 | Status: COMPLETED | OUTPATIENT
Start: 2023-10-02 | End: 2023-10-03

## 2023-10-02 RX ORDER — ATORVASTATIN CALCIUM 80 MG/1
20 TABLET, FILM COATED ORAL AT BEDTIME
Refills: 0 | Status: DISCONTINUED | OUTPATIENT
Start: 2023-10-03 | End: 2023-10-03

## 2023-10-02 RX ORDER — ACETAMINOPHEN 500 MG
1000 TABLET ORAL ONCE
Refills: 0 | Status: COMPLETED | OUTPATIENT
Start: 2023-10-02 | End: 2023-10-03

## 2023-10-02 RX ORDER — BUDESONIDE AND FORMOTEROL FUMARATE DIHYDRATE 160; 4.5 UG/1; UG/1
2 AEROSOL RESPIRATORY (INHALATION)
Refills: 0 | Status: DISCONTINUED | OUTPATIENT
Start: 2023-10-02 | End: 2023-10-03

## 2023-10-02 RX ORDER — APIXABAN 2.5 MG/1
2.5 TABLET, FILM COATED ORAL
Refills: 0 | Status: DISCONTINUED | OUTPATIENT
Start: 2023-10-03 | End: 2023-10-03

## 2023-10-02 RX ORDER — SODIUM CHLORIDE 9 MG/ML
1000 INJECTION INTRAMUSCULAR; INTRAVENOUS; SUBCUTANEOUS
Refills: 0 | Status: DISCONTINUED | OUTPATIENT
Start: 2023-10-02 | End: 2023-10-03

## 2023-10-02 RX ORDER — PANTOPRAZOLE SODIUM 20 MG/1
40 TABLET, DELAYED RELEASE ORAL
Refills: 0 | Status: DISCONTINUED | OUTPATIENT
Start: 2023-10-02 | End: 2023-10-03

## 2023-10-02 RX ADMIN — BENZOCAINE AND MENTHOL 1 LOZENGE: 5; 1 LIQUID ORAL at 22:08

## 2023-10-02 RX ADMIN — Medication 975 MILLIGRAM(S): at 23:24

## 2023-10-02 RX ADMIN — CELECOXIB 400 MILLIGRAM(S): 200 CAPSULE ORAL at 12:42

## 2023-10-02 RX ADMIN — APREPITANT 40 MILLIGRAM(S): 80 CAPSULE ORAL at 12:42

## 2023-10-02 RX ADMIN — Medication 2000 MILLIGRAM(S): at 22:07

## 2023-10-02 RX ADMIN — Medication 975 MILLIGRAM(S): at 12:42

## 2023-10-02 RX ADMIN — Medication 975 MILLIGRAM(S): at 22:08

## 2023-10-02 RX ADMIN — SODIUM CHLORIDE 75 MILLILITER(S): 9 INJECTION INTRAMUSCULAR; INTRAVENOUS; SUBCUTANEOUS at 22:10

## 2023-10-02 NOTE — DISCHARGE NOTE PROVIDER - CARE PROVIDER_API CALL
Jovan Rosas.  Orthopaedic Surgery  [address] [facility] [address]  [place] [address]  Phone: [phone]  Fax: [phone]  Follow Up Time:

## 2023-10-02 NOTE — DISCHARGE NOTE PROVIDER - HOSPITAL COURSE
The patient underwent a LEFT TOTAL KNEE REPLACEMENT on 10/2/2023. The patient received antibiotics consistent with SCIP guidelines. The patient underwent the procedure and had no intra-operative complications. Post-operatively, the patient was seen by medicine and PT. The patient received ELIQUIS for DVTP. The patient received pain medications per orthopedic pain management pathway and the pain was appropriately controlled. The patient did not have any post-operative medical complications. The patient was discharged in stable condition.

## 2023-10-02 NOTE — PHYSICAL THERAPY INITIAL EVALUATION ADULT - ADDITIONAL COMMENTS
Pt reports living with spouse in a condo with 3 RUI c rail, and bedroom on the 2nd level with 12 steps c rail. Pt amb without device and is independent with functional mobility, ADLs, and IADLs. Pt drives and is retired. Pt has support of family. Pt owns RW.

## 2023-10-02 NOTE — DISCHARGE NOTE PROVIDER - NSDCMRMEDTOKEN_GEN_ALL_CORE_FT
Advair Diskus 100 mcg-50 mcg inhalation powder: 1 inhaled 2 times a day  Albuterol (Eqv-ProAir HFA) 90 mcg/inh inhalation aerosol: 2 inhaled as needed for  shortness of breath and/or wheezing  aspirin 81 mg oral capsule: 1 orally once a day  ATORVASTATIN CALCIUM  20 MG TABS:   BENAZEPRIL HYDROCHLORIDE/HYDROCHLOR OTHIAZIDE 20-12.5 MG TABS:   coQ10 daily:   multivitamin daily:    acetaminophen 325 mg oral tablet: 3 tab(s) orally every 8 hours  Advair Diskus 100 mcg-50 mcg inhalation powder: 1 inhaled 2 times a day  Albuterol (Eqv-ProAir HFA) 90 mcg/inh inhalation aerosol: 2 inhaled as needed for  shortness of breath and/or wheezing  aspirin 81 mg oral capsule: 1 orally once a day  aspirin 81 mg oral delayed release tablet: 1 tab(s) orally 2 times a day to begin after the completion of eliquis  ATORVASTATIN CALCIUM  20 MG TABS:   BENAZEPRIL HYDROCHLORIDE/HYDROCHLOR OTHIAZIDE 20-12.5 MG TABS:   CeleBREX 200 mg oral capsule: 1 cap(s) orally 2 times a day  coQ10 daily:   Eliquis 2.5 mg oral tablet: 1 tab(s) orally 2 times a day  multivitamin daily:   oxyCODONE 5 mg oral tablet: 1 tab(s) orally every 6 hours as needed for  moderate pain MDD: 4  Protonix 40 mg oral delayed release tablet: 1 tab(s) orally once a day  Senna S 50 mg-8.6 mg oral tablet: 2 tab(s) orally once a day (at bedtime)

## 2023-10-02 NOTE — DISCHARGE NOTE PROVIDER - NSDCFUADDINST_GEN_ALL_CORE_FT
The patient will be seen in the office between 2-3 weeks for wound check.   **Your first post-operative visit has been scheduled prior to your admission. PLEASE CONTACT OFFICE TO CONFIRM THE APPOINTMENT DATE.   **  The silver based dressing is to be removed 7 days from the date of surgery.   ** CONTACT THE OFFICE IF THE FOLLOWING DEVELOP:  - the dressing becomes soiled or saturated  - you develop a fever greater that 101F  - the wound becomes red or you develop blistering around the wound  * Patient may shower after post-op day #3.   * The patient will continue home PT consistent with  total knee replacement protocol. Transition to outpatient PT will occur at the time of the first office visit.   * The patient will practice knee extension exercises regularly to minimize hamstring contraction.   * The patient is FULL weight bearing.  *** The patient will continue ELIQUIS 2.5mg twice a day for 2 weeks and then begin ASPIRIN 81mg twice a day for blood clot prevention. *** While on aspirin, the patient will take daily omeprazole or other similar medication to protect the stomach from irritation.   * The patient will take OXYCODONE AND TYLENOL for pain control and adjust according to prescription and patient needs. Contact the office if pain increases while taking prescribed pain medications or related concerns develop.  * Celebrex 200mg will be taken twice daily starting 10/4/2023 for 3 weeks for pain control and prevention of excessive bone growth. Additional prescription may be requested at your office follow-up visit.   * The patient will take Senna S while taking oxycodone to prevent narcotic associated constipation.  Additionally, increase water intake (drink at least 8 glasses of water daily) and try adding fiber to the diet by eating fruits, vegetables and foods that are rich in grains. If constipation is experienced, contact the medical/primary care provider to discuss further treatment options.  * To avoid injury at home:  - continue use of rolling walker until cleared by physical therapist  - have family or friend remove all throw rug or objects in hallways that may present a trip hazard.  - if you experience any dizziness or medical concerns, call your medical doctor or  911.  * The implant may activate metal detection devices.

## 2023-10-02 NOTE — PHYSICAL THERAPY INITIAL EVALUATION ADULT - RANGE OF MOTION EXAMINATION, REHAB EVAL
except left knee 0 to 90 deg/bilateral upper extremity ROM was WFL (within functional limits)/bilateral lower extremity ROM was WFL (within functional limits)

## 2023-10-02 NOTE — DISCHARGE NOTE PROVIDER - NSDCFUSCHEDAPPT_GEN_ALL_CORE_FT
Jovan Rosas  [facility]  ORTHOSURG [address]  Scheduled Appointment: 10/26/2023    Jovan Rosas  [facility]  ORTHORKURT [address]  Scheduled Appointment: 11/17/2023

## 2023-10-02 NOTE — PATIENT PROFILE ADULT - FALL HARM RISK - HARM RISK INTERVENTIONS
Assistance with ambulation/Assistance OOB with selected safe patient handling equipment/Communicate Risk of Fall with Harm to all staff/Discuss with provider need for PT consult/Monitor gait and stability/Provide patient with walking aids - walker, cane, crutches/Reinforce activity limits and safety measures with patient and family/Sit up slowly, dangle for a short time, stand at bedside before walking/Tailored Fall Risk Interventions/Use of alarms - bed, chair and/or voice tab/Visual Cue: Yellow wristband and red socks/Bed in lowest position, wheels locked, appropriate side rails in place/Call bell, personal items and telephone in reach/Instruct patient to call for assistance before getting out of bed or chair/Non-slip footwear when patient is out of bed/[place] to call system/Physically safe environment - no spills, clutter or unnecessary equipment/Purposeful Proactive Rounding/Room/bathroom lighting operational, light cord in reach

## 2023-10-03 ENCOUNTER — TRANSCRIPTION ENCOUNTER (OUTPATIENT)
Age: 73
End: 2023-10-03

## 2023-10-03 VITALS
TEMPERATURE: 99 F | SYSTOLIC BLOOD PRESSURE: 143 MMHG | DIASTOLIC BLOOD PRESSURE: 81 MMHG | RESPIRATION RATE: 18 BRPM | OXYGEN SATURATION: 97 % | HEART RATE: 84 BPM

## 2023-10-03 LAB
ANION GAP SERPL CALC-SCNC: 10 MMOL/L — SIGNIFICANT CHANGE UP (ref 5–17)
BUN SERPL-MCNC: 15 MG/DL — SIGNIFICANT CHANGE UP (ref 8–20)
CALCIUM SERPL-MCNC: 9.5 MG/DL — SIGNIFICANT CHANGE UP (ref 8.4–10.5)
CHLORIDE SERPL-SCNC: 104 MMOL/L — SIGNIFICANT CHANGE UP (ref 96–108)
CO2 SERPL-SCNC: 25 MMOL/L — SIGNIFICANT CHANGE UP (ref 22–29)
CREAT SERPL-MCNC: 0.52 MG/DL — SIGNIFICANT CHANGE UP (ref 0.5–1.3)
EGFR: 98 ML/MIN/1.73M2 — SIGNIFICANT CHANGE UP
GLUCOSE SERPL-MCNC: 138 MG/DL — HIGH (ref 70–99)
HCT VFR BLD CALC: 35 % — SIGNIFICANT CHANGE UP (ref 34.5–45)
HGB BLD-MCNC: 11.7 G/DL — SIGNIFICANT CHANGE UP (ref 11.5–15.5)
MCHC RBC-ENTMCNC: 32.4 PG — SIGNIFICANT CHANGE UP (ref 27–34)
MCHC RBC-ENTMCNC: 33.4 GM/DL — SIGNIFICANT CHANGE UP (ref 32–36)
MCV RBC AUTO: 97 FL — SIGNIFICANT CHANGE UP (ref 80–100)
PLATELET # BLD AUTO: 409 K/UL — HIGH (ref 150–400)
POTASSIUM SERPL-MCNC: 4.2 MMOL/L — SIGNIFICANT CHANGE UP (ref 3.5–5.3)
POTASSIUM SERPL-SCNC: 4.2 MMOL/L — SIGNIFICANT CHANGE UP (ref 3.5–5.3)
RBC # BLD: 3.61 M/UL — LOW (ref 3.8–5.2)
RBC # FLD: 14.3 % — SIGNIFICANT CHANGE UP (ref 10.3–14.5)
SODIUM SERPL-SCNC: 138 MMOL/L — SIGNIFICANT CHANGE UP (ref 135–145)
WBC # BLD: 15.55 K/UL — HIGH (ref 3.8–10.5)
WBC # FLD AUTO: 15.55 K/UL — HIGH (ref 3.8–10.5)

## 2023-10-03 PROCEDURE — 94640 AIRWAY INHALATION TREATMENT: CPT

## 2023-10-03 PROCEDURE — 85027 COMPLETE CBC AUTOMATED: CPT

## 2023-10-03 PROCEDURE — 36415 COLL VENOUS BLD VENIPUNCTURE: CPT

## 2023-10-03 PROCEDURE — 97110 THERAPEUTIC EXERCISES: CPT

## 2023-10-03 PROCEDURE — 99222 1ST HOSP IP/OBS MODERATE 55: CPT

## 2023-10-03 PROCEDURE — 97116 GAIT TRAINING THERAPY: CPT

## 2023-10-03 PROCEDURE — 86850 RBC ANTIBODY SCREEN: CPT

## 2023-10-03 PROCEDURE — C1776: CPT

## 2023-10-03 PROCEDURE — 86900 BLOOD TYPING SEROLOGIC ABO: CPT

## 2023-10-03 PROCEDURE — C1713: CPT

## 2023-10-03 PROCEDURE — S2900: CPT

## 2023-10-03 PROCEDURE — 86901 BLOOD TYPING SEROLOGIC RH(D): CPT

## 2023-10-03 PROCEDURE — 27447 TOTAL KNEE ARTHROPLASTY: CPT

## 2023-10-03 PROCEDURE — 80048 BASIC METABOLIC PNL TOTAL CA: CPT

## 2023-10-03 PROCEDURE — 73560 X-RAY EXAM OF KNEE 1 OR 2: CPT

## 2023-10-03 RX ORDER — PANTOPRAZOLE SODIUM 20 MG/1
1 TABLET, DELAYED RELEASE ORAL
Qty: 14 | Refills: 0
Start: 2023-10-03 | End: 2023-10-16

## 2023-10-03 RX ORDER — ACETAMINOPHEN 500 MG
3 TABLET ORAL
Qty: 0 | Refills: 0 | DISCHARGE
Start: 2023-10-03

## 2023-10-03 RX ORDER — OXYCODONE HYDROCHLORIDE 5 MG/1
1 TABLET ORAL
Qty: 28 | Refills: 0
Start: 2023-10-03 | End: 2023-10-09

## 2023-10-03 RX ORDER — ASPIRIN/CALCIUM CARB/MAGNESIUM 324 MG
1 TABLET ORAL
Qty: 28 | Refills: 0
Start: 2023-10-03 | End: 2023-10-16

## 2023-10-03 RX ORDER — CELECOXIB 200 MG/1
1 CAPSULE ORAL
Qty: 42 | Refills: 0
Start: 2023-10-03 | End: 2023-10-23

## 2023-10-03 RX ORDER — SENNOSIDES/DOCUSATE SODIUM 8.6MG-50MG
2 TABLET ORAL
Qty: 10 | Refills: 0
Start: 2023-10-03 | End: 2023-10-07

## 2023-10-03 RX ORDER — APIXABAN 2.5 MG/1
1 TABLET, FILM COATED ORAL
Qty: 28 | Refills: 0
Start: 2023-10-03 | End: 2023-10-16

## 2023-10-03 RX ADMIN — Medication 1000 MILLIGRAM(S): at 06:27

## 2023-10-03 RX ADMIN — APIXABAN 2.5 MILLIGRAM(S): 2.5 TABLET, FILM COATED ORAL at 06:13

## 2023-10-03 RX ADMIN — Medication 2000 MILLIGRAM(S): at 06:13

## 2023-10-03 RX ADMIN — BUDESONIDE AND FORMOTEROL FUMARATE DIHYDRATE 2 PUFF(S): 160; 4.5 AEROSOL RESPIRATORY (INHALATION) at 10:59

## 2023-10-03 RX ADMIN — PANTOPRAZOLE SODIUM 40 MILLIGRAM(S): 20 TABLET, DELAYED RELEASE ORAL at 06:13

## 2023-10-03 RX ADMIN — OXYCODONE HYDROCHLORIDE 5 MILLIGRAM(S): 5 TABLET ORAL at 00:52

## 2023-10-03 RX ADMIN — Medication 400 MILLIGRAM(S): at 06:12

## 2023-10-03 RX ADMIN — Medication 15 MILLIGRAM(S): at 06:13

## 2023-10-03 RX ADMIN — OXYCODONE HYDROCHLORIDE 5 MILLIGRAM(S): 5 TABLET ORAL at 01:52

## 2023-10-03 RX ADMIN — Medication 15 MILLIGRAM(S): at 01:02

## 2023-10-03 RX ADMIN — Medication 15 MILLIGRAM(S): at 00:08

## 2023-10-03 RX ADMIN — Medication 15 MILLIGRAM(S): at 06:27

## 2023-10-03 NOTE — CONSULT NOTE ADULT - ASSESSMENT
71 yo with PMH HTN, HLD, abnormal EKG, asthma , smoker , chronic left knee for about 6 months. Described pain as ache with no radiation to LLE. Received many gel injections in the past with no relief. Reports completed cortisone injections previously with the last one with no relief. Pt. reports pain is intermittent. Walking upstairs have been exacerbating pain. Lying down alleviate pain.    1. L knee chronic pain / OA ,   S/P L TKA ,   PT/OT/pain mgmt  DVT prophylaxis- as per ortho  Abx as per SCIP- given   Incentive spirometry  Prophylaxis of opioid  induced constipation.  Wound care / WB status  as per orho    2. Hx of Asthma - stable - continue Advair/ Albuterol home doses     3. HLD - continue statin    4. HTN - restart Benazepril/HCTZ - home dose POD #2 with parameters     5. Smoker - on Chantix at home -  counseled  to quit       6. DVT prophylaxis  - as per ortho protocol  Opioid induced constipation  prophylaxis - bowel regimen       Thank you for the courtesy of this consult .   Dispo plan is Home - likely today .    Medically stable to d/c once cleared by physical therapy / ortho .

## 2023-10-03 NOTE — DISCHARGE NOTE NURSING/CASE MANAGEMENT/SOCIAL WORK - NSDCPEWEB_GEN_ALL_CORE
[facility] for Tobacco Control website --- [email or web address] --- [email or web address]

## 2023-10-03 NOTE — PROGRESS NOTE ADULT - SUBJECTIVE AND OBJECTIVE BOX
RAKESH BERNARD  [id number]    History: 72y Female is status post left total knee arthroplasty on POD # 0. Patient is doing well and is comfortable. The patient's pain is controlled using the prescribed pain medications. Denies CP, SOB, dizziness, HA, fever/chills, numbness or tingling. No new complaints.    Vital Signs Last 24 Hrs  T(C): 36.6 (02 Oct 2023 20:01), Max: 37 (02 Oct 2023 18:15)  T(F): 97.8 (02 Oct 2023 20:01), Max: 98.6 (02 Oct 2023 18:15)  HR: 91 (02 Oct 2023 20:01) (81 - 99)  BP: 143/96 (02 Oct 2023 20:01) (111/75 - 156/74)  BP(mean): 91 (02 Oct 2023 19:00) (80 - 94)  RR: 18 (02 Oct 2023 20:01) (14 - 18)  SpO2: 95% (02 Oct 2023 20:01) (93% - 99%)    Parameters below as of 02 Oct 2023 20:01  Patient On (Oxygen Delivery Method): room air      General: Alert, awake, NAD  Physical exam: The left knee dressing is clean, dry and intact. No drainage, discharge, erythema, blistering or ecchymosis noted.   The calf is supple nontender b/l.   SILT. +AT/GC/EHL/FHL.   2+ DP pulse. BCR. No cyanosis.    Plan:   - DVT prophylaxis as prescribed, including use of compression devices and ankle pumps  - consult physical therapy  - Weight bearing status of surgical extremity: WBAT LLE  - Incentive spirometry encouraged  - Pain control as clinically indicated  - ABX per protocol 
RAKESH KENNEDYJULIAN  [id number]    History: 72y Female is status post left total knee arthroplasty on 10/2, POD#1. Patient is doing well and is comfortable. The patient's pain is controlled using the prescribed pain medications. The patient is participating in physical therapy, WBAT LLE. Denies nausea, vomiting, chest pain, shortness of breath, abdominal pain or fever. No new complaints.      MEDICATIONS  (STANDING):  acetaminophen     Tablet .. 975 milliGRAM(s) Oral every 8 hours  apixaban 2.5 milliGRAM(s) Oral two times a day  atorvastatin 20 milliGRAM(s) Oral at bedtime  benzocaine/menthol Lozenge 1 Lozenge Oral every 24 hours  budesonide  80 MICROgram(s)/formoterol 4.5 MICROgram(s) Inhaler 2 Puff(s) Inhalation two times a day  hydrochlorothiazide 12.5 milliGRAM(s) Oral daily  influenza  Vaccine (HIGH DOSE) 0.7 milliLiter(s) IntraMuscular once  ketorolac   Injectable 15 milliGRAM(s) IV Push every 6 hours  lisinopril 20 milliGRAM(s) Oral daily  pantoprazole    Tablet 40 milliGRAM(s) Oral before breakfast  polyethylene glycol 3350 17 Gram(s) Oral at bedtime  senna 2 Tablet(s) Oral at bedtime  sodium chloride 0.9%. 1000 milliLiter(s) (75 mL/Hr) IV Continuous <Continuous>    MEDICATIONS  (PRN):  albuterol    90 MICROgram(s) HFA Inhaler 2 Puff(s) Inhalation every 6 hours PRN for shortness of breath and/or wheezing  aluminum hydroxide/magnesium hydroxide/simethicone Suspension 30 milliLiter(s) Oral four times a day PRN Indigestion  HYDROmorphone   Tablet 4 milliGRAM(s) Oral every 3 hours PRN Severe Pain (7 - 10)  magnesium hydroxide Suspension 30 milliLiter(s) Oral daily PRN Constipation  ondansetron Injectable 4 milliGRAM(s) IV Push every 6 hours PRN Nausea and/or Vomiting  oxyCODONE    IR 10 milliGRAM(s) Oral every 3 hours PRN Moderate Pain (4 - 6)  oxyCODONE    IR 5 milliGRAM(s) Oral every 3 hours PRN Mild Pain (1 - 3)      Physical exam: The left knee ace dressing remains clean, dry and intact. No drainage or discharge noted on dressings.  The calf is supple nontender. Passive range of motion is acceptable to due postoperative pain. No calf tenderness. Sensation to light touch is grossly intact distally. Motor function distally is 5/5. Extensor hallucis longus and flexor hallucis longus are intact. No foot drop. 2+ dorsalis pedis pulse. Capillary refill is less than 2 seconds. No cyanosis.    Primary Orthopedic Assessment:  •	s/p LEFT total knee replacement    Secondary  Orthopedic Assessment(s):   •	    Secondary  Medical Assessment(s):   •	    Plan:   •	DVT prophylaxis as prescribed, including use of compression devices and ankle pumps  •	Continue physical therapy  •	Weightbearing as tolerated of the Left lower extremity with assistance of a walker  •	Incentive spirometry encouraged  •	Pain control as clinically indicated  •	Discharge planning – anticipated discharge is Home today once medically optimized and cleared by PT.

## 2023-10-03 NOTE — DISCHARGE NOTE NURSING/CASE MANAGEMENT/SOCIAL WORK - NSDCPEFALRISK_GEN_ALL_CORE
For information on Fall & Injury Prevention, visit: [email or web address] OR  [email or web address] OR  https://www.cdc.gov/steadi/patient.html

## 2023-10-03 NOTE — DISCHARGE NOTE NURSING/CASE MANAGEMENT/SOCIAL WORK - PATIENT PORTAL LINK FT
You can access the FollowMyHealth Patient Portal offered by [facility] by registering at the following website: [email or web address]. By joining [employer]’s FollowMyHealth portal, you will also be able to view your health information using other applications (apps) compatible with our system.

## 2023-10-03 NOTE — DISCHARGE NOTE NURSING/CASE MANAGEMENT/SOCIAL WORK - NSDCPEEMAIL_GEN_ALL_CORE
[facility] for Tobacco Control email [email or web address]

## 2023-10-03 NOTE — PROGRESS NOTE ADULT - REASON FOR ADMISSION
Nursing Suicide Assessment Note - Inpatient    Current assessment:    Current C-SSRS score: Moderate Risk      Protective Factors / Reason for Living: Responsibility to pets, Social supports    Interventions:   · Revised / Updated the Safety / Recovery Plan  · Access to personal clothing without strings or belts  · Standard tray    Other Interventions Implemented:  · Continue 15 minute checks     Pt up ad candida on the unit. Anxious affect and move. Continues to be focused on going home, asking writer repeatedly if she \"gets everything done do I get to leave?\" Pt completed her CBT workbook, as she was working on this with her parents yesterday. It appears that her mother has filled out some sections of the workbook. Pt has a difficult time discussing CBT, she appears untruthful when giving examples of changing her thoughts around. She is looking forward to her family session, but states \"We will probably just stare at each other. We don't have any problems.\" Continues to minimize her impulsivity and suicide attempt. Pt declines to make any changes to her safety/recovery plan. Pt denies SI/ HI/ AVH, no unsafe bx's observed. Staff will continue to monitor.    Left tka

## 2023-10-03 NOTE — CONSULT NOTE ADULT - SUBJECTIVE AND OBJECTIVE BOX
Patient is a 72y old  Female who is s/p L TKA, POD #1. Pain well controlled , denies n/v, voiding , tolerating diet ,   participating with physical therapy .     CC: L knee chronic pain postop well controlled       HPI:  71 yo with PMH HTN, HLD, abnormal EKG, asthma , smoker , chronic left knee for about 6 months. Described pain as ache with no radiation to LLE. Received many gel injections in the past with no relief. Reports completed cortisone injections previously with the last one with no relief. Pt. reports pain is intermittent. Walking upstairs have been exacerbating pain. Lying down alleviate pain.      PAST MEDICAL & SURGICAL HISTORY:  Benign essential HTN      HLD (hyperlipidemia)      History of appendectomy      S/P excision of ganglion cyst          Social History:  Tabacco - smoker , 1/2 PPD X 50 yrs ,   now trying to quit , down to few cigarettes a day    ETOH - occasionally   Illicit drug abuse - denies    FAMILY HISTORY:  FH: liver cancer (Sibling, Father)        Allergies    No Known Allergies    Intolerances        HOME MEDICATIONS :       Advair Diskus 100 mcg-50 mcg inhalation powder: 1 inhaled 2 times a day (02 Oct 2023 12:32)  Albuterol (Eqv-ProAir HFA) 90 mcg/inh inhalation aerosol: 2 inhaled as needed for  shortness of breath and/or wheezing (02 Oct 2023 12:32)  ATORVASTATIN CALCIUM  20 MG TABS:  (02 Oct 2023 12:32)  BENAZEPRIL HYDROCHLORIDE/HYDROCHLOR OTHIAZIDE 20-12.5 MG TABS:  (02 Oct 2023 12:32)  coQ10 daily:  (02 Oct 2023 12:32)  multivitamin daily:  (02 Oct 2023 12:32)      REVIEW OF SYSTEMS:    L knee chronic pain , all other systems are reviewed and are negative .       MEDICATIONS  (STANDING):  acetaminophen     Tablet .. 975 milliGRAM(s) Oral every 8 hours  apixaban 2.5 milliGRAM(s) Oral two times a day  atorvastatin 20 milliGRAM(s) Oral at bedtime  benzocaine/menthol Lozenge 1 Lozenge Oral every 24 hours  budesonide  80 MICROgram(s)/formoterol 4.5 MICROgram(s) Inhaler 2 Puff(s) Inhalation two times a day  hydrochlorothiazide 12.5 milliGRAM(s) Oral daily  influenza  Vaccine (HIGH DOSE) 0.7 milliLiter(s) IntraMuscular once  ketorolac   Injectable 15 milliGRAM(s) IV Push every 6 hours  lisinopril 20 milliGRAM(s) Oral daily  pantoprazole    Tablet 40 milliGRAM(s) Oral before breakfast  polyethylene glycol 3350 17 Gram(s) Oral at bedtime  senna 2 Tablet(s) Oral at bedtime  sodium chloride 0.9%. 1000 milliLiter(s) (75 mL/Hr) IV Continuous <Continuous>    MEDICATIONS  (PRN):  albuterol    90 MICROgram(s) HFA Inhaler 2 Puff(s) Inhalation every 6 hours PRN for shortness of breath and/or wheezing  aluminum hydroxide/magnesium hydroxide/simethicone Suspension 30 milliLiter(s) Oral four times a day PRN Indigestion  HYDROmorphone   Tablet 4 milliGRAM(s) Oral every 3 hours PRN Severe Pain (7 - 10)  magnesium hydroxide Suspension 30 milliLiter(s) Oral daily PRN Constipation  ondansetron Injectable 4 milliGRAM(s) IV Push every 6 hours PRN Nausea and/or Vomiting  oxyCODONE    IR 10 milliGRAM(s) Oral every 3 hours PRN Moderate Pain (4 - 6)  oxyCODONE    IR 5 milliGRAM(s) Oral every 3 hours PRN Mild Pain (1 - 3)      Vital Signs Last 24 Hrs  T(C): 36.4 (03 Oct 2023 04:41), Max: 37 (02 Oct 2023 18:15)  T(F): 97.6 (03 Oct 2023 04:41), Max: 98.6 (02 Oct 2023 18:15)  HR: 73 (03 Oct 2023 04:41) (73 - 99)  BP: 146/71 (03 Oct 2023 04:41) (111/75 - 156/74)  BP(mean): 91 (02 Oct 2023 19:00) (80 - 94)  RR: 18 (03 Oct 2023 04:41) (14 - 18)  SpO2: 95% (03 Oct 2023 04:41) (93% - 99%)    Parameters below as of 03 Oct 2023 04:41  Patient On (Oxygen Delivery Method): room air        PHYSICAL EXAM:    GENERAL: NAD, well-groomed, well-developed  HEAD:  Atraumatic, Normocephalic  EYES: EOMI, PERRLA, conjunctiva and sclera clear  NECK: Supple, No JVD, Normal thyroid  NERVOUS SYSTEM:  Alert & Oriented X4, no focal deficit   CHEST/LUNG: CTA  b/l,  no rales, rhonchi, wheezing, or rubs  HEART: Regular rate and rhythm; No murmurs, rubs, or gallops  ABDOMEN: Soft, Nontender, Nondistended; Bowel sounds present  EXTREMITIES:  2+ Peripheral Pulses, No clubbing, cyanosis, or edema ,   LYMPH: No lymphadenopathy noted  SKIN: No rashes or lesions    LABS:                        11.7   15.55 )-----------( 409      ( 03 Oct 2023 05:33 )             35.0     10-03    138  |  104  |  15.0  ----------------------------<  138<H>  4.2   |  25.0  |  0.52    Ca    9.5      03 Oct 2023 05:33          RADIOLOGY & ADDITIONAL STUDIES:    < from: Xray Knee 1 or 2 Views, Left (10.02.23 @ 18:24) >  ACC: [id number] EXAM:  XR KNEE 1-2 VIEWS LT   ORDERED BY: KP BISHOP     PROCEDURE DATE:  10/02/2023          INTERPRETATION:  HISTORY: Postoperative  knee replacement.    TECHNIQUE: Two views of the LEFT knee are submitted.    FINDINGS: Status post tricompartmental knee replacement with the femoral,   tibial and patellar components in anatomic alignment.  There is no fracture .    IMPRESSION:  Knee prosthetic components in proper anatomical alignment.    --- End of Report ---      SELINA MARS MD; Attending Radiologist  This document has been electronically signed. Oct  2 2023  9:39PM    < end of copied text >

## 2023-10-04 ENCOUNTER — TRANSCRIPTION ENCOUNTER (OUTPATIENT)
Age: 73
End: 2023-10-04

## 2023-10-06 ENCOUNTER — TRANSCRIPTION ENCOUNTER (OUTPATIENT)
Age: 73
End: 2023-10-06

## 2023-10-12 ENCOUNTER — TRANSCRIPTION ENCOUNTER (OUTPATIENT)
Age: 73
End: 2023-10-12

## 2023-10-20 ENCOUNTER — TRANSCRIPTION ENCOUNTER (OUTPATIENT)
Age: 73
End: 2023-10-20

## 2023-10-26 ENCOUNTER — APPOINTMENT (OUTPATIENT)
Dept: ORTHOPEDIC SURGERY | Facility: CLINIC | Age: 73
End: 2023-10-26
Payer: MEDICARE

## 2023-10-26 VITALS — WEIGHT: 140 LBS | BODY MASS INDEX: 23.32 KG/M2 | HEIGHT: 65 IN

## 2023-10-26 PROCEDURE — 99024 POSTOP FOLLOW-UP VISIT: CPT

## 2023-10-26 PROCEDURE — 73562 X-RAY EXAM OF KNEE 3: CPT | Mod: LT

## 2023-10-30 ENCOUNTER — TRANSCRIPTION ENCOUNTER (OUTPATIENT)
Age: 73
End: 2023-10-30

## 2023-11-15 ENCOUNTER — TRANSCRIPTION ENCOUNTER (OUTPATIENT)
Age: 73
End: 2023-11-15

## 2023-11-17 ENCOUNTER — APPOINTMENT (OUTPATIENT)
Dept: ORTHOPEDIC SURGERY | Facility: CLINIC | Age: 73
End: 2023-11-17
Payer: MEDICARE

## 2023-11-17 VITALS — HEIGHT: 65 IN | WEIGHT: 140 LBS | BODY MASS INDEX: 23.32 KG/M2

## 2023-11-17 DIAGNOSIS — Z47.1 AFTERCARE FOLLOWING JOINT REPLACEMENT SURGERY: ICD-10-CM

## 2023-11-17 DIAGNOSIS — Z96.659 PRESENCE OF UNSPECIFIED ARTIFICIAL KNEE JOINT: ICD-10-CM

## 2023-11-17 PROCEDURE — 99024 POSTOP FOLLOW-UP VISIT: CPT

## 2023-11-17 PROCEDURE — 73562 X-RAY EXAM OF KNEE 3: CPT | Mod: 26,LT

## 2023-12-04 RX ORDER — OXYCODONE 5 MG/1
5 TABLET ORAL EVERY 6 HOURS
Qty: 24 | Refills: 0 | Status: ACTIVE | COMMUNITY
Start: 2023-10-18 | End: 1900-01-01

## 2023-12-10 ENCOUNTER — NON-APPOINTMENT (OUTPATIENT)
Age: 73
End: 2023-12-10

## 2023-12-28 ENCOUNTER — TRANSCRIPTION ENCOUNTER (OUTPATIENT)
Age: 73
End: 2023-12-28

## 2024-01-02 ENCOUNTER — NON-APPOINTMENT (OUTPATIENT)
Age: 74
End: 2024-01-02

## 2024-02-09 ENCOUNTER — APPOINTMENT (OUTPATIENT)
Dept: ORTHOPEDIC SURGERY | Facility: CLINIC | Age: 74
End: 2024-02-09
Payer: MEDICARE

## 2024-02-09 VITALS — WEIGHT: 140 LBS | BODY MASS INDEX: 23.32 KG/M2 | HEIGHT: 65 IN

## 2024-02-09 DIAGNOSIS — Z96.652 PRESENCE OF LEFT ARTIFICIAL KNEE JOINT: ICD-10-CM

## 2024-02-09 PROCEDURE — 73562 X-RAY EXAM OF KNEE 3: CPT | Mod: 26,LT

## 2024-02-09 PROCEDURE — 99213 OFFICE O/P EST LOW 20 MIN: CPT

## 2024-02-09 NOTE — HISTORY OF PRESENT ILLNESS
[de-identified] : Ms. RAKESH BERNARD is a 73 year old female doing very well status post left TKR 10/2/23. Patient is doing great three months from surgery. Patient continues to go to PT and notes this is going well. Patient denies any falls or trauma. She is back to all normal activity without pain. Patient is not taking NSAIDs at this time.

## 2024-02-09 NOTE — REASON FOR VISIT
[Follow-Up Visit] : a follow-up visit for [Other: ____] : [unfilled] [FreeTextEntry2] : S/P Left TKR, DOS: 10/2/23.

## 2024-02-09 NOTE — PHYSICAL EXAM
[de-identified] : Multi body exam  The patient appears well nourished and in no apparent distress. The patient is alert and oriented to person, place, and time. Affect and mood appear normal. The head is normocephalic and atraumatic. The eyes reveal normal sclera and extra ocular muscles are intact. The tongue is midline with no apparent lesions. Skin shows normal turgor with no evidence of eczema or psoriasis. No respiratory distress noted. Sensation grossly intact.   [de-identified] : Exam left knee: Skin reveals well-healed incision without signs of infection. A small piece of suture material was removed from the mid incision, no drainage. There is no effusion.  Range of motion 0-130 degrees of flexion measured with goniometer.  Sensations intact.  Strength 5/5.  [de-identified] : X-ray 3 views left knee demonstrate well fixed and aligned right total knee replacement without signs of fracture or loosening.

## 2024-02-09 NOTE — DISCUSSION/SUMMARY
[de-identified] : Ms. RAKESH BERNARD is a 73 year old female doing very well 3 months status post left total knee replacement.  She will continue physical therapy and then transition to home exercise.  She denies need for anti-inflammatories at this time.  Patient will follow-up annually.

## 2024-03-07 NOTE — H&P PST ADULT - NS MD HP INPLANTS MED DEV
Emergency Department Provider Note       PCP: No primary care provider on file.   Age: 5 y.o.   Sex: male     DISPOSITION Decision To Discharge 03/07/2024 03:28:37 AM       ICD-10-CM    1. Crying with unclear etiology  R45.83       2. Autism  F84.0           Medical Decision Making     Patient with history of autism, comes to the ED today for increased fussiness/agitation beginning this evening at home not relieved with Tylenol suppositories.  Per mother at bedside, patient diagnosed with influenza and strep pharyngitis 7 days ago.  Patient has been taking antibiotics.  Patient has since returned to school yesterday, had a normal day per mother.  This evening, after a nap, patient woke up and was crying.  Mother states patient had normal fluid intake, however, did not eat this evening.  He did eat yesterday at school.  On initial exam, patient asleep.  Mother states he \"cried himself to sleep\".  Patient easily aroused and again begins to cry.  No focal deficits on exam.  He is afebrile.  Dry mucous membranes are present.  Discussed procedural sedation with IM ketamine with mother to obtain more information, as she states patient will not allow an IV to remain established.  Written consent signed.  ED Course as of 03/07/24 0329   Thu Mar 07, 2024   0240 Pt given Ketamine IM, IV established and IVF infusing.  Straight cath performed, minimal urine output.  [LE]      ED Course User Index  [LE] Marisela Figueroa Y, DO     After sedation wore off, patient able to remove IV before additional dosage of medication could be given.  He is afebrile, without hypoxia or tachycardia.  No meningeal signs during exam.  CBC without leukocytosis or anemia.  CMP unremarkable.  Urinalysis with 10-20 WBCs, mother states patient is currently on amoxicillin for strep pharyngitis.  Will not order additional meds.  Patient has an established pediatrician at [facility].  Do not believe further imaging is needed at this time with known 
Hardware to left wrist due to surgery

## 2024-03-19 NOTE — H&P PST ADULT - CIGARETTE, PACK YRS
NEUROLOGY CONSULTATION NOTE:                             Brandyn Reese     Referring provider: Dr. Rangel, Annie  Date of evaluation: 3/19/2024  YOB: 1993    Chief Complaint   Patient presents with    Office Visit     F/up MS     Patient is accompanied by self.     Patient presents for follow-up.    MS symptoms:  \" In the summer he had more bathroom trips\"  Towards the end of the year had more numbness in hands   Since December he had more numbness in right side of face   Reports losing balance towards the end of August.2023  Stumbles but no falls     Yesterday he had pins and needles in his right side    Received a message from Pharmacy regarding the possibility of non-adherence to medication (Copaxone) based on patient's refill history.      Son had lead poisoning.  He moved into his mother's house.   He was busy and was not able to follow up with our clinic.    Reports he tends to miss taking copaxone.  Due for repeat MRIs      ----------------  Pt reports he is doing well overall.    3-4 weeks ago he had some numbness and tingling in right side of face, lasted 15 min at home when waking up and 30 min with lesser severity when it occured work. He feels it was related to the humidity, and warm temperature.     He feels he tolerates his glatiramer well. He has noticed that if he does his injections on his right side his skin will become irritated at the injection site (rashes) but not on his left side.    His back pain frequency is about the same. When his back pain occurs with activity it can radiate down his R side and the pain usually subsides after about 5 minutes. Laying down on his back makes his back pain worse, so he often sleeps on his side or sitting. He feels he gets some exercise caring for his son, and may have improvement in body composition though he is similar weight as previously.    On Glatiramer (Copaxone) 40 mg inj 3 days a week  Usually inject to his left side  No side effects      Takes Vit D3 5000U per day   Vit D levels 13.6-> 22.6     Repeat MRI brain w/ w/o contrast on 4/17/22:  No evidence of focal white matter abnormality or abnormal postcontrast enhancement.      Repeat MRI C-spine w/ and w/o contrast 4/17/22:  Single nonenhancing focus of faintly increased T2 signal in the midline dorsal aspect of the cord at C4-C5 intervertebral disc level appears smaller.       MRI L spine in 2021, no significant NFN or spinal stenosis. Small broad-based posterior disc protrusion without significant spinal canal or neural foraminal stenosis at L5/S1.     Pt is interested to do physical therapy.       -Follow up 4/8/22:  Pt is doing well. He has noticed that if he does his injections on his right side his skin will become irritated at the injection site (rashes) but not on his left side. He has noticed episodic increased tingling pain in his extremities but believes it's related to work. Due to the holiday and tax season, he has had an increase in work load and heavy lifting which have exacerbated his symptoms. He does have work accommodations but it's difficult due to the busy season. He is working on switching to a desk job, but comments that he likes staying active so \"mentally, that's been hard.\" He has continued to try and go to the gym to improve his strength.     He has not been taking the vitamin D regularly.     Reviewed:  Labs 3/5/21  Vit D = 13.6    On Copaxone 40 mg 3 days a week    Pt is due for MRIs and blood work     -------------------  Patient reports he is doing better since our last visit.  Symptoms:  He reports sometimes intermittent hand numbness  3 weeks ago he had right hand numbness, could not move his hand, lasted 1 to 2 hours   He had another episode of transient pins-and-needles sensation in left hand.  Reports intermittent low back pain, worse with heavy lifting.    Patient feels he gets these transient episodes usually around Sunday-Monday when he has to work more,  with heavy lifting or working in a hot environment.      On Copaxone 40 mg 3 days a week  Patient reports no significant side effects  He has had some mild swelling in the injection areas  He states after receiving autoinjectors has had no problems with injection site swelling.  He reports he has been trying to adjust the water temperature.    He has not started vitamin D.    Vitamin D level -13.6.  Interleukin-2 receptor-  926.7    Patient found out that his second cousin (mother side) has multiple sclerosis.      Patient works for [employer]-he is considered as a part-time employee.   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        ----- Initial HPI:   27 year old male very pleasant, w/ PMHx of prediabetes and migraines who presented to [facility] on 2/2/21 with back pain and numbness in the LEs found to have C-spine and brain lesions most consistent with demyelinating disease.     Pt received Solumedrol 1g daily x 5 days.     Pt reports hx of:  Numbness- improved s/p high dose steroid  Now he is able to feel the texture, floor and temp  Fever, chills, fatigue and soreness in Dec.2020  Urinary frequency in Nov.2020  Lhermitte sign+ for the past few years-  electric shock-like sensation that occurs on flexion of the neck.   Episodic Rt leg tingling and numbness x4-5 years ago  Few times blurry vision and pain in his eyes  Feels weak, dizzy and fatigued with hot showers     Personally reviewed pt's chart, imaging and labs:     -Brain MRI w/w/0 contrast 2/3/21:  Numerous foci of FLAIR signal change within the supratentorial and infratentorial white matter.  Given patient's age and recent cervical spine results these imaging findings are most suspicious for demyelinating disease.     -C/T spine MRI w/o contrast (contrast was not given for unclear reason): Minimally expansile intramedullary T2  signal changes within the central aspect of the cervical spinal cord at C4-C5 level with measuring 1.7 x 0.4x0.4 cm. Findings are most suspicious for demyelinating process such as multiple sclerosis.        I have reviewed the pertinent laboratory tests:  MAG and serum NMO- unremarkable  LP, CSF pro 51, Glu 102  Elevated IgG/albumin index 0.27  A1c 5.6%        --Neuro consult documentation at [facility]:   ''28 yo RH M with h/o migraines with visual aura for which he doesn't take meds, ?occasional \"blackouts\" of unknown cause since his teens who presented to [facility] on 2/2/21 with about a month of low back pain and extremity numbness. In the beginning of January he started feeling pressure-like sometimes sharp pain in his lower back. A few times woke him up wanting to cry because it was so bad. 9 days ago he started feeling numbness in his sides, then glutes over the next day, then groin the next day, and down into the legs as of 6 days ago. He feels urgency with urinating and a couple of times didn't make it to the bathroom. No issues with BMs. Genital numbness and fewer morning erections. Went to [facility] on 1/28 and had MRI lower back which he is told was normal so was discharged and was to follow up as an outpatient which he hasn't yet. 2 days ago he felt the numbness was spreading up to his belly button and last night to his rib cage. The numbness feels worse when he's walking. Back pain comes and goes. No weakness. He had flu-like symptoms and diarrhea at the end of December (Covid-negative). 2-3 weeks ago had a root canal. Works at [employer] and doesn't usually do lifting but no back spraining or pain from work.''     ------------------------------------------------------------------------------------------------------------------------------------------------------------------------  Patient's medications, allergies, past medical, surgical, social and family histories were reviewed and updated as appropriate.    PAST  MEDICAL, SURGICAL, FAMILY AND SOCIAL HISTORY:  Patient Active Problem List   Diagnosis    Demyelinating disease (CMD)    MS (multiple sclerosis) (CMD)    Chronic midline low back pain     Past Medical History:   Diagnosis Date    Migraine headache with aura      Past Surgical History:   Procedure Laterality Date    No past surgeries       Family History   Problem Relation Age of Onset    Coronary Artery Disease Father         triple bypass    Diabetes Other     Obesity Other     Hypertension Other     Crohn's Disease Maternal Aunt     Migraine Maternal Aunt     Neurologic Disorder Maternal Aunt      Social History     Tobacco Use   Smoking Status Former    Current packs/day: 0.00    Types: Cigarettes    Quit date:     Years since quittin.2   Smokeless Tobacco Never     Patient Active Problem List     Substance and Sexual Activity   Drug Use Not Currently     Social History     Substance and Sexual Activity   Alcohol Use Yes    Comment: 1-2 every other day       ALLERGIES:  ALLERGIES:  No Known Allergies      MEDICATIONS:      Current Outpatient Medications   Medication Sig Dispense Refill    glatiramer (COPAXONE) 40 MG/ML prefilled syringe for injection INJECT 40MG SUBCUTANEOUSLY THREE TIMES WEEKLY AT LEAST 48 HOURS APART. No future refills until seen in office. 12 mL 3    glatiramer (COPAXONE) 40 MG/ML prefilled syringe for injection No more refills until seen in office. (Patient not taking: Reported on 3/19/2024) 12 mL 0    Cholecalciferol (Vitamin D3) 125 mcg (5,000 units) tablet       Cholecalciferol (Vitamin D3) 1.25 mg (50,000 units) tablet Take 1 tablet by mouth 1 day a week. 8 tablet 0    lidocaine (LIDOCARE) 4 % patch Place 1 patch onto the skin daily. Do not start before 2021. 2 patch 0    acetaminophen (TYLENOL) 325 MG tablet Take 2 tablets by mouth every 4 hours as needed for Pain. 10 tablet 0     No current facility-administered medications for this visit.       REVIEW OF  SYSTEMS:  (except as listed above in HPI)  General:  No fevers, chills, excessive fatigue, loss of appetite or unexpected weight gain  Eyes:  No eye pain, vision change or vision loss   ENT: No hearing loss, ear pain, hoarse throat/voice or nasal congestion  Cardiovascular: No chest pain, palpitations/irregular heart beat, lightheadedness or edema in LEs  Respiratory: No shortness of breath, wheezing, snoring or coughing up blood  Gastrointestinal:  No blood in stools, abdominal pain or unexplained nausea/vomiting  Genitourinary: No blood in urine, pain with urination or inability to control urine  Endocrine:  No heat/cold intolerance, excessive thirst or appetite  Skin: no new rashes or change in mole  Hematologic:  No easy bruising, bleeding or on 'blood thinners'  Musculoskeletal: No joint pain, joint swelling or muscle aches  Neurologic: per HPI  Psychiatric:  No anxiety, depression, insomnia or suicidal thoughts      PHYSICAL EXAM:  Vitals: Blood pressure 136/80, pulse 73, resp. rate 17, height 5' 7\" (1.702 m), weight (!) 143.2 kg (315 lb 12.9 oz).  Gen: No acute distress.  Morbidly obese.  Head:  Normocephalic    ENT:  Normal pharynx  Neck:  Supple, No lymphadenopathy  Heart:  Normal rate and regular rhythm.  Extremities:  Pulses present in extremities. No deformities.  Skin: No obvious rashes noted on body  Musculoskeletal:  Good ROM in limbs  Psych:  Affect was appropriate to situation and mood was normal.  Neurologic:  Mental status:  Awake and alert.   Oriented to person and place.  No aphasia or neglect. Patient is able to provide adequate history, good memory and concentration, speech is normal.   Cranial nerves:   Pupils are equal and briskly reactive to light.  Extraocular muscles are intact.  Fundi are benign. Normal visual fields to confrontation.  Facial sensation to pin is symmetric V1-V3 bilaterally. No facial asymmetry is present.  Hearing is grossly intact.  Soft palate elevates equally. Tongue  is midline.  Shoulder shrug is symmetric..  Motor exam:  Strength in all 4 extremities is 5/5.   Sensory exam:  Normal sensation to pin prick and vibration in all 4 extremities.  Cerebellar exam:  FNF and HTS are intact.   Reflexes:  Biceps, Brachioradialis, Triceps, Patellar, and Achilles 1-1+/4, Babinski's were downgoing  Gait:  Gait, Tandem, and Romberg were normal.  ---Abnormal findings:  Mild decreased sensation to PP in right UE/LE and face compared to left side       TESTING/RESULTS/RECORDS REVIEWED:  No visits with results within 6 Month(s) from this visit.   Latest known visit with results is:   Lab Services on 04/08/2022   Component Date Value    Vitamin D, 25-Hydroxy 04/08/2022 22.6 (L)     WBC 04/08/2022 7.3     RBC 04/08/2022 5.10     HGB 04/08/2022 14.4     HCT 04/08/2022 44.5     MCV 04/08/2022 87.3     MCH 04/08/2022 28.2     MCHC 04/08/2022 32.4     RDW-CV 04/08/2022 13.2     RDW-SD 04/08/2022 42.0     PLT 04/08/2022 355     NRBC 04/08/2022 0     Neutrophil, Percent 04/08/2022 66     Lymphocytes, Percent 04/08/2022 22     Mono, Percent 04/08/2022 8     Eosinophils, Percent 04/08/2022 3     Basophils, Percent 04/08/2022 1     Immature Granulocytes 04/08/2022 0     Absolute Neutrophils 04/08/2022 4.9     Absolute Lymphocytes 04/08/2022 1.6     Absolute Monocytes 04/08/2022 0.6     Absolute Eosinophils  04/08/2022 0.2     Absolute Basophils 04/08/2022 0.1     Absolute Immature Granul* 04/08/2022 0.0           MRI CERVICAL SPINE W WO CONTRAST  Narrative: EXAM: MRI CERVICAL SPINE W WO CONTRAST    CLINICAL INDICATION: Multiple sclerosis, follow-up study.    COMPARISON: None.    TECHNIQUE: The study was acquired using the following pulse sequences:  Sagittal T1, sagittal T2, sagittal IR, axial T2. Following uneventful  intravenous administration of  8 ml of Gadavist, axial and sagittal  T1-weighted images were obtained.    FINDINGS: 5 x 6 x 7 mm nonenhancing focus of faintly increased T2 signal in  the  midline dorsal aspect of the cervical cord at C4-C5 intervertebral disc  level appears smaller and less expansile as compared to 02/02/2021.  No  obvious new foci of abnormal signal or enhancement in the cervical cord.   The curvature of the cervical spine is normal. Vertebral body height,  alignment and bone marrow signal intensity appear normal. The central  spinal canal is adequately patent without evidence of extra-axial fluid  collection or mass. The cervical cord is normal in course, caliber and  signal intensity. The craniocervical junction is unremarkable. There is no  evidence of abnormal post contrast enhancement within the cervical  spine.There are no significant degenerative changes and the central canal  and neural foramina appear adequately patent.     The paraspinal soft tissue appear unremarkable.  Impression: Signal nonenhancing focus of faintly increased T2 signal in the  midline dorsal aspect of the cord at C4-C5 intervertebral disc level  appears smaller.    Electronically Signed by: DANNY MCCALLUM M.D.   Signed on: 4/18/2022 10:33 AM   MRI BRAIN W WO CONTRAST  Narrative: EXAM: MRI BRAIN W WO CONTRAST    CLINICAL INDICATION: Demyelinating disease.    COMPARISON: 02/03/2021.    TECHNIQUE: The study was acquired using the following pulse sequences:  Sagittal T1, axial T2, axial FLAIR, axial T1, axial GRE. Diffusion-weighted  images and ADC maps were obtained. Following uneventful intravenous  administration of 8 ml of Gadavist, axial and coronal T1-weighted images  were acquired.    FINDINGS: Diffusion-weighted images demonstrate no evidence of acute  ischemia or infarction.  There is no evidence of mass, abnormal parenchymal  or leptomeningeal enhancement. . The lateral ventricles are symmetric,  midline and normal in size .    Normal signal flow-voids were identified within the proximal branches of  the intracranial arteries and major dural sinuses.  The pituitary fossa  appears unremarkable. The  craniocervical junction is normal. The orbital  structures appear symmetric and unremarkable. The mastoid air cells are  clear. Mild mucosal thickening is noted in bilateral ethmoid and maxillary  sinuses, right sphenoid sinus.  Impression:  No evidence of focal white matter abnormality or abnormal postcontrast  enhancement.    Mild mucosal thickening bilateral paranasal sinuses without air-fluid  levels.  This may represent chronic sinusitis in appropriate clinical  setting.    Electronically Signed by: DANNY MCCALLUM M.D.   Signed on: 4/18/2022 10:25 AM        ASSESSMENT:  Problem List Items Addressed This Visit          Neuro    Demyelinating disease (CMD) - Primary    Relevant Orders    MRI CERVICAL SPINE W WO CONTRAST    MRI THORACIC SPINE W WO CONTRAST    MRI BRAIN W WO CONTRAST    MS (multiple sclerosis) (CMD)    Relevant Medications    glatiramer (COPAXONE) 40 MG/ML prefilled syringe for injection         CLINICAL SUMMARY:  Personally reviewed pt's chart, imaging and labs.     -Brain MRI w/w/o contrast 2/3/21:  Numerous foci of FLAIR signal change within the supratentorial and infratentorial white matter.  Given patient's age and recent cervical spine results these imaging findings are most suspicious for demyelinating disease/ MS.     -C/T spine MRI w/o contrast (contrast was not given for unclear reason): Minimally expansile intramedullary T2 signal changes within the central aspect of the cervical spinal cord at C4-C5 level with measuring 1.7 x 0.4x0.4 cm. Findings are most suspicious for demyelinating process such as multiple sclerosis.     I have reviewed the pertinent laboratory tests:  MAG and serum NMO- unremarkable  LP, CSF pro 51, Glu 102  Elevated IgG/albumin index 0.27     Pt received Solumedrol 1g daily x 5 days.       Plan:  -IL-2 receptor  -Vit D level  -Start Vit D supplements po daily   -Discussed in detail potential maintenance therapies for MS. Patient is interested to start the treatment.  He will be started on Copaxone inj- needs pre-authorization   -Will repeat MRIs (brain and C-spine w/w/o contrast) in one year- sooner if he has flare ups    -------------------------------------------------    5/17/2021:    Patient reports he is doing better since our last visit.  He reports sometimes intermittent hand numbness or transient pins-and-needles sensation in hands. Reports intermittent low back pain, worse with heavy lifting.    Patient works for [employer]. 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 Patient feels he gets these episodes usually around Sunday-Monday when he has to work more, with heavy lifting or working in a hot environment.      On Copaxone 40 mg 3 days a week.  Patient reports no significant side effects.  He has had some mild swelling in the injection areas.  He states after receiving autoinjectors has had no problems with injection site swelling.    He has not started vitamin D.    Vitamin D level -13.6.  Interleukin-2 receptor-  926.7    His second cousin has multiple sclerosis.    ----------------------------------  4/8/22  Overall, patient is doing well with current medication regiment.   He has no new concerns or complaints     ------------------    8/12/22:    Patient tolerates glatiramer injections well and feels he is doing well.     On Glatiramer (Copaxone) 40 mg inj 3 days a week  Usually inject to his left side  No side effects     Takes Vit D3 5000U per day   Vit D levels 13.6-> 22.6     Repeat MRI brain w/ w/o contrast on 4/17/22:  No evidence of focal white matter abnormality or abnormal postcontrast enhancement.      Repeat MRI C-spine w/ and w/o contrast 4/17/22:  Single nonenhancing focus of faintly increased T2 signal in the midline dorsal aspect of the cord at C4-C5 intervertebral disc level appears smaller.       MRI L spine in 2021, no  significant NFN or spinal stenosis. Small broad-based posterior disc protrusion without significant spinal canal or neural foraminal stenosis at L5/S1.     Pt is interested to do physical therapy.     ---------------    3/19/24:  MS symptoms:  Numbness/ tingling, pins needle sensation  Felt off balance in summer  Poor Medication compliance  Was stable when he was compliant with the med    Received a message from Pharmacy regarding the possibility of non-adherence to medication (Copaxone) based on patient's refill history.      Reports he tends to miss taking copaxone  Due for repeat MRIs      PLAN:  -Will repeat MRI Brain, cervical and thoracic spine with and w/o contrast  -Continue Vit D 5000U po per day  -He could benefit from an alternative therapy infusion vs bi-weekly injections given poor medication compliance. Will plan to schedule his follow ups with Dr. Buckner (our MS specialist)    -Refill of Copaxone inj 40 mg 3 days a week  -Avoid high temperatures  -Recommended weight loss for low back pain  -MRI L spine in 2021, no significant NFN or spinal stenosis. Small broad-based posterior disc protrusion without significant spinal canal or neural foraminal stenosis at L5/S1.   -PT exercises for chronic low back pain   -Educated patient on lifestyle modifications including adequate sleep, exercise and limit heavy lifting when possible     Discussed with patient:  Do's and don'ts:   Get plenty of rest. Extreme tiredness is a common symptom of MS.  Plan your activities in advance.  Stay out of excessive heat.  Try stretching. It can be useful to help ease stiff muscles.    Get exercise. Aerobic exercise may help your strength, muscle tone, balance, and coordination.   Try swimming. It may be a good exercise in which your temperature doesn't go up. But don't swim alone.   Eat a well-balanced diet.       -Medical compliance with plan discussed and risks of non-compliance reviewed.    -Patient education completed on  disease process, etiology & prognosis.    -Patient expresses understanding of the plan.    -Proper usage and side effects of medications reviewed & discussed.   -All questions were answered.       Total time spent during the encounter[ 43 minutes].  Previsit- Reviewed prior clinic notes, PCP notes, ER notes, imaging and laboratory data- 7 minutes.  Visit- Performed medically appropriate history, obtained history from independent historian and physical examination.  Surveillance labs and diagnostic studies-requested and reviewed with the patient, discussed diagnosis and prognosis, risks and benefits of treatment options, instructions for management, treatment, compliance and follow-up care.  Patient and family education is provided- 24 min  Post visit- Document the encounter and coordinated care- 12 min    Greater than 50% of the visit was spent counseling regarding above issues and coordinating patient's care.      Felicity Beaver MD  [facility]   0

## 2024-04-19 ENCOUNTER — NON-APPOINTMENT (OUTPATIENT)
Age: 74
End: 2024-04-19

## 2024-08-02 ENCOUNTER — NON-APPOINTMENT (OUTPATIENT)
Age: 74
End: 2024-08-02

## 2024-08-28 ENCOUNTER — APPOINTMENT (OUTPATIENT)
Dept: ORTHOPEDIC SURGERY | Facility: CLINIC | Age: 74
End: 2024-08-28
Payer: MEDICARE

## 2024-08-28 VITALS
HEIGHT: 65 IN | SYSTOLIC BLOOD PRESSURE: 159 MMHG | BODY MASS INDEX: 23.32 KG/M2 | DIASTOLIC BLOOD PRESSURE: 91 MMHG | HEART RATE: 106 BPM | WEIGHT: 140 LBS

## 2024-08-28 DIAGNOSIS — Z96.652 PRESENCE OF LEFT ARTIFICIAL KNEE JOINT: ICD-10-CM

## 2024-08-28 PROCEDURE — 73562 X-RAY EXAM OF KNEE 3: CPT | Mod: 26,LT

## 2024-08-28 PROCEDURE — G2211 COMPLEX E/M VISIT ADD ON: CPT

## 2024-08-28 PROCEDURE — 99214 OFFICE O/P EST MOD 30 MIN: CPT

## 2024-08-28 NOTE — HISTORY OF PRESENT ILLNESS
[de-identified] : Ms. RAKESH BERNARD is a 73 year old female, status post left TKR 10/2/23, presenting for follow up. Patient states "my knee is killing me". She elaborates to say that her pain is all lateral, radiating up and down the posterolateral aspect of the left leg. She denies any fevers chills or incisional issues. Patient denies any falls or trauma. She is no longer going to PT. She is not taking NSAIDs or Tylenol at this time. She admits to lower back pain, for which she has only seen a chiropractor. She is back to all normal activity without pain. Patient is not taking NSAIDs at this time.

## 2024-08-28 NOTE — DISCUSSION/SUMMARY
[de-identified] : Ms. RAKESH BERNARD is a 73 year old female 10 months status post left TKR. I have reassured patient that the knee replacement is stable on xrays, and there are no signs of infection. She does not seem to have true knee pain on exam. Likely her pain is radiculopathy. She was referred to Dr Kraft for further evaluation. She declines PT at this time. I recommended a course of Mobic. The patient was given a prescription for the Mobic with directions. The patient was instructed to stop the medicine and call the office if there are any adverse reaction to the medicine. The patient was also instructed to consult with their primary care doctor prior to starting the medication. Follow up in 4-6 weeks if no improvement.

## 2024-08-28 NOTE — PHYSICAL EXAM
[de-identified] : Multi body exam  The patient appears well nourished and in no apparent distress. The patient is alert and oriented to person, place, and time. Affect and mood appear normal. The head is normocephalic and atraumatic. The eyes reveal normal sclera and extra ocular muscles are intact. The tongue is midline with no apparent lesions. Skin shows normal turgor with no evidence of eczema or psoriasis. No respiratory distress noted. Sensation grossly intact.   [de-identified] : Exam left knee: Skin reveals well-healed incision without signs of infection. There is no effusion.  Range of motion 0-130 degrees of flexion measured with goniometer. There is no knee pain on exam. No instability. No extensor lag.  Sensations intact.  Strength 5/5.  Exam left hip: SLR is smooth and intact, no pain with hip ROM.  [de-identified] : X-ray 3 views left knee demonstrate well fixed and aligned right total knee replacement without signs of fracture or loosening.

## 2024-08-30 RX ORDER — TRAMADOL HYDROCHLORIDE 50 MG/1
50 TABLET, COATED ORAL
Qty: 21 | Refills: 0 | Status: ACTIVE | COMMUNITY
Start: 2024-08-30 | End: 1900-01-01

## 2024-09-06 ENCOUNTER — APPOINTMENT (OUTPATIENT)
Dept: PHYSICAL MEDICINE AND REHAB | Facility: CLINIC | Age: 74
End: 2024-09-06
Payer: MEDICARE

## 2024-09-06 VITALS
RESPIRATION RATE: 15 BRPM | WEIGHT: 135 LBS | OXYGEN SATURATION: 98 % | DIASTOLIC BLOOD PRESSURE: 81 MMHG | HEIGHT: 65 IN | HEART RATE: 101 BPM | BODY MASS INDEX: 22.49 KG/M2 | SYSTOLIC BLOOD PRESSURE: 130 MMHG

## 2024-09-06 DIAGNOSIS — M47.816 SPONDYLOSIS W/OUT MYELOPATHY OR RADICULOPATHY, LUMBAR REGION: ICD-10-CM

## 2024-09-06 DIAGNOSIS — M54.16 RADICULOPATHY, LUMBAR REGION: ICD-10-CM

## 2024-09-06 PROCEDURE — G2211 COMPLEX E/M VISIT ADD ON: CPT

## 2024-09-06 PROCEDURE — 99204 OFFICE O/P NEW MOD 45 MIN: CPT

## 2024-09-06 RX ORDER — MELOXICAM 7.5 MG/1
7.5 TABLET ORAL
Qty: 60 | Refills: 0 | Status: DISCONTINUED | COMMUNITY
Start: 2024-08-28 | End: 2024-09-06

## 2024-09-06 RX ORDER — GABAPENTIN 100 MG/1
100 CAPSULE ORAL
Qty: 90 | Refills: 0 | Status: ACTIVE | COMMUNITY
Start: 2024-09-06 | End: 1900-01-01

## 2024-09-06 NOTE — DATA REVIEWED
[FreeTextEntry1] : X-ray L knee 8/28/24 reviewed and interpreted by me: s/p L TKA, hardware intact  EXAM: [id number] - CT KNEE ONLY LT  - ORDERED BY: GRICELDA ANDREA   PROCEDURE DATE:  09/05/2023    INTERPRETATION:  CT of the left knee  CLINICAL INFORMATION: Knee pain. Bryon protocol. Preoperative planning TECHNIQUE: Axial CT images were obtained of the left knee, hip and ankle with coronal and sagittal reconstructions. No contrast was administered. Bryon protocol.  FINDINGS:  There is advanced medial compartment knee arthrosis with bone-on-bone apposition, subchondral sclerosis and osteophyte formation. There is a moderate knee joint effusion. Hip and tibiotalar joints are intact. No high-grade muscle atrophy. Imaged pelvic viscera is grossly unremarkable. There are scattered vascular calcifications.  IMPRESSION:  Advanced medial compartment knee arthrosis. Bryon protocol.  --- End of Report ---       JULIO PAUL MD; Attending Radiologist This document has been electronically signed. Sep 10 2023  2:32PM

## 2024-09-06 NOTE — HISTORY OF PRESENT ILLNESS
[FreeTextEntry1] : Ms. RAKESH BERNARD is a 73 year old female who presents with low back pain.   Location: Low back and left knee Onset: Chronic for months, gradually worsening Provocation/Palliative: Worse with activity, better with rest Quality: Just "hurts" Radiation: Feels like pain travels from L knee up to L lower back Severity: Can be 10/10, on average is around 4-5/10 Timing: Not improving with time   Denies any associated numbness. Denies any associated leg weakness. Denies any loss of bowel/bladder control or any groin numbness. Previous medications trialed: Mobic, Tramadol without much relief Previous procedures relevant to complaint: L TKA in 10/2023 Conservative therapy tried?: Chiropractic care without help

## 2024-09-06 NOTE — ASSESSMENT
[FreeTextEntry1] : Ms. RAKESH BERNARD is a 73 year old female who presents with persistent low back pain associated with L proximal leg pain, possibly due to a lumbar radiculopathy. Denies any red flag signs. Will recommend: - X-ray L knee reviewed - Will obtain MRI L Spine to evaluate for radiculopathy, specifically L L3 radiculopathy - Will start trial of Gabapentin 100mg Qhs with gradual increase to 100mg TID if tolerated. Patient aware of side effects and risks including sedation, leg swelling, and possible mood changes.  - Start PT 2-3x/week for stretching, strengthening (especially of core muscles), ROM exercises, HEP and modalities PRN including myofascial release, moist heat   RTC in 4-6 weeks. Patient aware of red flag signs including any changes to their bowel/bladder control, groin numbness or new weakness. Patient knows to seek immediate attention by calling 911 or going to nearest ER if these symptoms appear.   This patient is being managed for a complex chronic pain that requires ongoing medical management. The nature of this condition requires a longitudinal relationship and monitoring over time for appropriate treatment.

## 2024-09-06 NOTE — PHYSICAL EXAM
[FreeTextEntry1] : PE: Constitutional: In NAD, calm and cooperative MSK (Back)  Inspection: no gross swelling identified  Palpation: Tenderness of the left lower lumbar paraspinals  ROM: Pain at end lumbar extension>flexion  Strength: 5/5 strength in bilateral lower extremities  Reflexes: 1+Patella reflex on L (s/p TKA), 2+ Patella reflex on R, 2+ Achilles reflex bilaterally, negative clonus bilaterally  Sensation: Intact to light touch in bilateral lower extremities Special tests: SLR: negative bilaterally SARAH BETH: negative bilaterally FADIR: negative bilaterally Facet loading: positive bilaterally

## 2024-09-06 NOTE — DATA REVIEWED
[FreeTextEntry1] : X-ray L knee 8/28/24 reviewed and interpreted by me: s/p L TKA, hardware intact  EXAM: [id number] - CT KNEE ONLY LT  - ORDERED BY: GRICELDA ANDREA   PROCEDURE DATE:  09/05/2023    INTERPRETATION:  CT of the left knee  CLINICAL INFORMATION: Knee pain. Bryon protocol. Preoperative planning TECHNIQUE: Axial CT images were obtained of the left knee, hip and ankle with coronal and sagittal reconstructions. No contrast was administered. Bryon protocol.  FINDINGS:  There is advanced medial compartment knee arthrosis with bone-on-bone apposition, subchondral sclerosis and osteophyte formation. There is a moderate knee joint effusion. Hip and tibiotalar joints are intact. No high-grade muscle atrophy. Imaged pelvic viscera is grossly unremarkable. There are scattered vascular calcifications.  IMPRESSION:  Advanced medial compartment knee arthrosis. Bryon protocol.  --- End of Report ---       JULIO PAUL MD; Attending Radiologist This document has been electronically signed. Sep 10 2023  2:32PM

## 2024-09-18 ENCOUNTER — RX RENEWAL (OUTPATIENT)
Age: 74
End: 2024-09-18

## 2024-09-20 ENCOUNTER — APPOINTMENT (OUTPATIENT)
Dept: PHYSICAL MEDICINE AND REHAB | Facility: CLINIC | Age: 74
End: 2024-09-20
Payer: MEDICARE

## 2024-09-20 VITALS
HEART RATE: 101 BPM | BODY MASS INDEX: 22.49 KG/M2 | RESPIRATION RATE: 15 BRPM | HEIGHT: 65 IN | SYSTOLIC BLOOD PRESSURE: 146 MMHG | WEIGHT: 135 LBS | DIASTOLIC BLOOD PRESSURE: 82 MMHG

## 2024-09-20 DIAGNOSIS — M54.16 RADICULOPATHY, LUMBAR REGION: ICD-10-CM

## 2024-09-20 DIAGNOSIS — M47.816 SPONDYLOSIS W/OUT MYELOPATHY OR RADICULOPATHY, LUMBAR REGION: ICD-10-CM

## 2024-09-20 DIAGNOSIS — M48.061 SPINAL STENOSIS, LUMBAR REGION WITHOUT NEUROGENIC CLAUDICATION: ICD-10-CM

## 2024-09-20 PROCEDURE — 99214 OFFICE O/P EST MOD 30 MIN: CPT

## 2024-09-20 PROCEDURE — G2211 COMPLEX E/M VISIT ADD ON: CPT

## 2024-09-20 RX ORDER — GABAPENTIN 300 MG/1
300 CAPSULE ORAL 3 TIMES DAILY
Qty: 90 | Refills: 0 | Status: ACTIVE | COMMUNITY
Start: 2024-09-20 | End: 1900-01-01

## 2024-09-20 NOTE — PHYSICAL EXAM
[FreeTextEntry1] : PE: Constitutional: In NAD, calm and cooperative MSK (Back)  Inspection: no gross swelling identified  Palpation: Tenderness of the bilateral lower lumbar paraspinals  ROM: Pain at end lumbar extension>>>flexion  Strength: 5/5 strength in bilateral lower extremities  Reflexes: 1+Patella reflex on L (s/p TKA), 2+ Patella reflex on R, 2+ Achilles reflex bilaterally, negative clonus bilaterally  Sensation: Intact to light touch in bilateral lower extremities Special tests: SLR: negative bilaterally SARAH BETH: negative bilaterally FADIR: negative bilaterally Facet loading: positive bilaterally.

## 2024-09-20 NOTE — ASSESSMENT
[FreeTextEntry1] : Ms. RAKESH BERNARD is a 73 year old female who presents with persistent low back pain with significant spondylosis and DDD seen on MRI. Denies any red flag signs. Will recommend: - X-ray L knee reviewed - Discussed with patient the risks (including but not limited to bleeding, infection, nerve damage, etc), benefits and alternatives to a medial branch block injection x 2 with a plan for a lumbar RFA if MBBs provide significant relief for which patient understands. Will plan on a bilateral L4-5 and L5-S1 MBB. She will obtain PCP clearance.  - Will continue Gabapentin 100/100/200 as she is gaining some benefit from it. Patient aware of side effects and risks including sedation, leg swelling, and possible mood changes. - She will continue HEP only for now as she does not want to risk increasing pain with PT.  - Patient refused surgical consultation at this time  Return for procedures. Patient aware of red flag signs including any changes to their bowel/bladder control, groin numbness or new weakness. Patient knows to seek immediate attention by calling 911 or going to nearest ER if these symptoms appear.  This patient is being managed for a complex chronic pain that requires ongoing medical management. The nature of this condition requires a longitudinal relationship and monitoring over time for appropriate treatment.

## 2024-09-20 NOTE — HISTORY OF PRESENT ILLNESS
[FreeTextEntry1] : Ms. RAKESH BERNARD is a 73 year old female who presents for follow up. At last visit, she was ordered an MRI L Spine, started on Gabapentin and started on PT. She is taking Gabapentin 100/100/200 with some relief. She no longer has significant leg pain but her pain is now more located across low back. She has not done PT as she is worried that it will make pain worse.    Location: Across low back Onset: Chronic for months, gradually worsening Provocation/Palliative: Worse with activity, better with rest Quality: Just "hurts" Radiation: No significant radiation at this time.  Severity: Can be 10/10, on average is around 4-5/10 Timing: Not improving with time  No bowel/bladder changes. No groin numbness.

## 2024-10-03 ENCOUNTER — APPOINTMENT (OUTPATIENT)
Dept: PHYSICAL MEDICINE AND REHAB | Facility: AMBULATORY SURGERY CENTER | Age: 74
End: 2024-10-03
Payer: MEDICARE

## 2024-10-03 DIAGNOSIS — M47.816 SPONDYLOSIS W/OUT MYELOPATHY OR RADICULOPATHY, LUMBAR REGION: ICD-10-CM

## 2024-10-03 PROCEDURE — 64493 INJ PARAVERT F JNT L/S 1 LEV: CPT | Mod: 50

## 2024-10-03 PROCEDURE — 64494 INJ PARAVERT F JNT L/S 2 LEV: CPT | Mod: 50

## 2024-10-03 NOTE — PROCEDURE
[de-identified] : Lumbar Medial Branch Block Under Fluoroscopic Guidance   Attending: Kentrell Kraft DO  PREOPERATIVE DIAGNOSIS: Lumbar Facet Joint Pain POSTOPERATIVE DIAGNOSIS: same  SEDATION: As per Anesthesia   PROCEDURE PERFORMED:  Lumbar Medial Branch Block at the bilateral L4-5 and L5-S1 levels   ESTIMATED BLOOD LOSS:  None FLUOROSCOPY WAS USED.    PROCEDURE AND FINDINGS:   Patient was greeted in the pre-procedure holding area. The risk, benefits and alternatives to the procedure were again reviewed with the patient and written informed consent was placed in the chart. They were taken to the procedure room and positioned prone on the fluoroscopy table.  Prior to the procedure a time out was completed, verifying correct patient, procedure, and site.     An AP fluoroscopic  film was taken to identify the vertebral bodies, pedicles, transverse processes and superior articulating processes of interest. The skin was prepped with chlorhexidine and draped in the usual sterile fashion. The skin and subcutaneous tissue overlying the aforementioned anatomic targets were anesthetized using a 27-gauge 1-1/2 inch needle with 1% preservative-free lidocaine for a total volume of 5 mls.     Then a 25-gauge 3.5 inch Quincke spinal needle was advanced under fluoroscopic guidance to the junction of the superior articular process and transverse process of the levels of interest (and at the junction of the sacral ala and superior articular process for the L5 dorsal ramus). After negative aspiration, 0.5 mls of contrast was injected under AP view at each level. There was no evidence of intravascular uptake or intrathecal spread on imaging.    At this point, after negative aspiration, a solution of 0.5ml of Lidocaine 2% was injected at each level without incident. The needles were flushed with a small amount of contrast and removed. The needle insertion site was dressed appropriately.   Patient was taken to the recovery room where they were monitored for a brief period of time. They tolerated the procedure well and were discharged home in stable condition with post procedural instructions.

## 2024-10-16 NOTE — PATIENT PROFILE ADULT - [id number]_NUR
I returned Ramo's call- NA- LMTC- VM.   His LAHAP has been approved. I forwarded a copy of his LAHAP card to Kristine so that he will be able to fill his meds.    cardiovascular/respiratory

## 2024-10-17 ENCOUNTER — TRANSCRIPTION ENCOUNTER (OUTPATIENT)
Age: 74
End: 2024-10-17

## 2024-10-23 ENCOUNTER — APPOINTMENT (OUTPATIENT)
Dept: PHYSICAL MEDICINE AND REHAB | Facility: CLINIC | Age: 74
End: 2024-10-23
Payer: MEDICARE

## 2024-10-23 VITALS
DIASTOLIC BLOOD PRESSURE: 81 MMHG | HEART RATE: 77 BPM | SYSTOLIC BLOOD PRESSURE: 136 MMHG | BODY MASS INDEX: 22.49 KG/M2 | RESPIRATION RATE: 14 BRPM | WEIGHT: 135 LBS | HEIGHT: 65 IN

## 2024-10-23 DIAGNOSIS — M47.816 SPONDYLOSIS W/OUT MYELOPATHY OR RADICULOPATHY, LUMBAR REGION: ICD-10-CM

## 2024-10-23 DIAGNOSIS — M70.62 TROCHANTERIC BURSITIS, LEFT HIP: ICD-10-CM

## 2024-10-23 PROCEDURE — G2211 COMPLEX E/M VISIT ADD ON: CPT

## 2024-10-23 PROCEDURE — 99214 OFFICE O/P EST MOD 30 MIN: CPT

## 2024-11-20 ENCOUNTER — APPOINTMENT (OUTPATIENT)
Dept: PHYSICAL MEDICINE AND REHAB | Facility: CLINIC | Age: 74
End: 2024-11-20
Payer: MEDICARE

## 2024-11-20 VITALS
OXYGEN SATURATION: 99 % | BODY MASS INDEX: 22.49 KG/M2 | HEIGHT: 65 IN | HEART RATE: 114 BPM | DIASTOLIC BLOOD PRESSURE: 80 MMHG | RESPIRATION RATE: 15 BRPM | WEIGHT: 135 LBS | SYSTOLIC BLOOD PRESSURE: 114 MMHG

## 2024-11-20 DIAGNOSIS — M47.816 SPONDYLOSIS W/OUT MYELOPATHY OR RADICULOPATHY, LUMBAR REGION: ICD-10-CM

## 2024-11-20 DIAGNOSIS — F17.200 NICOTINE DEPENDENCE, UNSPECIFIED, UNCOMPLICATED: ICD-10-CM

## 2024-11-20 DIAGNOSIS — M53.3 SACROCOCCYGEAL DISORDERS, NOT ELSEWHERE CLASSIFIED: ICD-10-CM

## 2024-11-20 PROCEDURE — G2211 COMPLEX E/M VISIT ADD ON: CPT

## 2024-11-20 PROCEDURE — 99214 OFFICE O/P EST MOD 30 MIN: CPT

## 2024-12-05 ENCOUNTER — APPOINTMENT (OUTPATIENT)
Dept: PHYSICAL MEDICINE AND REHAB | Facility: AMBULATORY SURGERY CENTER | Age: 74
End: 2024-12-05
Payer: MEDICARE

## 2024-12-05 DIAGNOSIS — M53.3 SACROCOCCYGEAL DISORDERS, NOT ELSEWHERE CLASSIFIED: ICD-10-CM

## 2024-12-05 PROCEDURE — 27096 INJECT SACROILIAC JOINT: CPT | Mod: LT

## 2024-12-27 ENCOUNTER — APPOINTMENT (OUTPATIENT)
Dept: PHYSICAL MEDICINE AND REHAB | Facility: CLINIC | Age: 74
End: 2024-12-27
Payer: MEDICARE

## 2024-12-27 VITALS
RESPIRATION RATE: 14 BRPM | HEART RATE: 76 BPM | SYSTOLIC BLOOD PRESSURE: 136 MMHG | DIASTOLIC BLOOD PRESSURE: 74 MMHG | HEIGHT: 63 IN | WEIGHT: 135 LBS | BODY MASS INDEX: 23.92 KG/M2

## 2024-12-27 DIAGNOSIS — M47.816 SPONDYLOSIS W/OUT MYELOPATHY OR RADICULOPATHY, LUMBAR REGION: ICD-10-CM

## 2024-12-27 DIAGNOSIS — M53.3 SACROCOCCYGEAL DISORDERS, NOT ELSEWHERE CLASSIFIED: ICD-10-CM

## 2024-12-27 PROCEDURE — 99213 OFFICE O/P EST LOW 20 MIN: CPT

## 2024-12-27 PROCEDURE — G2211 COMPLEX E/M VISIT ADD ON: CPT

## 2025-08-02 ENCOUNTER — NON-APPOINTMENT (OUTPATIENT)
Age: 75
End: 2025-08-02

## (undated) DEVICE — DRSG DERMABOND 0.7ML

## (undated) DEVICE — MAKO DRAPE KIT

## (undated) DEVICE — MAKO VIZADISC KNEE TRACKING KIT

## (undated) DEVICE — PACK TOTAL KNEE

## (undated) DEVICE — VENODYNE/SCD SLEEVE CALF MEDIUM

## (undated) DEVICE — BLADE SCALPEL SAFETYLOCK #15

## (undated) DEVICE — MAKO STRYKER SILICONE RETRACTOR CORD

## (undated) DEVICE — PLV/PSP-ESU T7E14768DX: Type: DURABLE MEDICAL EQUIPMENT

## (undated) DEVICE — DRAPE IOBAN 33" X 23"

## (undated) DEVICE — NDL HYPO SAFE 20G X 1.5" (YELLOW)

## (undated) DEVICE — SLING SHOULDER IMMOBILIZER CLINIC LARGE

## (undated) DEVICE — DRSG STOCKINETTE IMPERVIOUS LG

## (undated) DEVICE — DRAPE STICKY U BLUE 60 X 84"

## (undated) DEVICE — SOL IRR BAG NS 0.9% 3000ML

## (undated) DEVICE — DRSG KLING 3"

## (undated) DEVICE — WOUND IRR SURGIPHOR

## (undated) DEVICE — HOOD FLYTE STRYKER SURGICOOL W PEELAWAY

## (undated) DEVICE — SYR LUER LOK 30CC

## (undated) DEVICE — ZIMMER PULSAVAC PLUS FAN KIT

## (undated) DEVICE — SUT VICRYL 2-0 27" CT-2 UNDYED

## (undated) DEVICE — SPECIMEN CONTAINER 4OZ

## (undated) DEVICE — SUT MONOSOF 4-0 18" P-12

## (undated) DEVICE — DRAPE C ARM MINI PACK FOR 6800

## (undated) DEVICE — WARMING BLANKET LOWER ADULT

## (undated) DEVICE — DRAPE TOWEL BLUE 17" X 24"

## (undated) DEVICE — GLV 8 PROTEXIS (WHITE)

## (undated) DEVICE — ZIMMER CEMENT MIXING SYSTEM COMPACT VACUUM

## (undated) DEVICE — DRAPE 1/2 SHEET 40X57"

## (undated) DEVICE — GLV 8.5 PROTEXIS (WHITE)

## (undated) DEVICE — MAKO CHECKPOINT KIT FEMORAL / TIBIAL

## (undated) DEVICE — NDL HYPO SAFE 18G X 1.5" (PINK)

## (undated) DEVICE — GLV COTTON LG STERILE

## (undated) DEVICE — DRILL BIT ACUMED MINI ACUTRAK II LONG PROFILE

## (undated) DEVICE — PLV-SCD MACHINE: Type: DURABLE MEDICAL EQUIPMENT

## (undated) DEVICE — SYR LUER LOK 10CC

## (undated) DEVICE — DRSG XEROFORM 2 X 2"

## (undated) DEVICE — DRAPE SPLIT SHEET 77" X 108"

## (undated) DEVICE — ELCTR STRYKER NEPTUNE SMOKE EVACUATION PENCIL (GREEN)

## (undated) DEVICE — SUT VICRYL 0 18" CT-1 UNDYED (POP-OFF)

## (undated) DEVICE — DRSG TAPE MICROPORE 2"

## (undated) DEVICE — POSITIONER LEG WRAP STERILE

## (undated) DEVICE — PACK HAND

## (undated) DEVICE — SUT POLYSORB 4-0 18" P-12 UNDYED

## (undated) DEVICE — DRAPE 3/4 SHEET 52X76"

## (undated) DEVICE — MAKO BLADE NARROW

## (undated) DEVICE — DRSG MEPILEX 10 X 25CM (4 X 10") POST OP AG SILVER

## (undated) DEVICE — HOOD T7 NON-PEELAWAY

## (undated) DEVICE — DRAPE LIGHT HANDLE COVER (GREEN)

## (undated) DEVICE — DRAPE 3/4 SHEET W REINFORCEMENT 56X77"

## (undated) DEVICE — PLV/PSP-TOURNIQUET #6 402009190020: Type: DURABLE MEDICAL EQUIPMENT

## (undated) DEVICE — MAKO BLADE STANDARD

## (undated) DEVICE — WARMING BLANKET UPPER ADULT

## (undated) DEVICE — SOL IRR POUR H2O 1000ML

## (undated) DEVICE — SOL IRR POUR NS 0.9% 1000ML

## (undated) DEVICE — TOURNIQUET CUFF 18" DUAL PORT SINGLE BLADDER LUER LOCK (BLACK)

## (undated) DEVICE — DRAPE U POLY BLUE 60"X60"

## (undated) DEVICE — GOWN LG

## (undated) DEVICE — SUT MONOCRYL 3-0 27" PS-2 UNDYED

## (undated) DEVICE — DRSG WEBRIL 3"

## (undated) DEVICE — DRAPE XL SHEET 77X98"

## (undated) DEVICE — URETERAL CATH RED RUBBER 16FR (ORANGE)

## (undated) DEVICE — NDL HYPO SAFE 25G X 5/8" (ORANGE)